# Patient Record
Sex: FEMALE | Race: WHITE | NOT HISPANIC OR LATINO
[De-identification: names, ages, dates, MRNs, and addresses within clinical notes are randomized per-mention and may not be internally consistent; named-entity substitution may affect disease eponyms.]

---

## 2022-04-30 ENCOUNTER — TRANSCRIPTION ENCOUNTER (OUTPATIENT)
Age: 32
End: 2022-04-30

## 2022-04-30 ENCOUNTER — INPATIENT (INPATIENT)
Facility: HOSPITAL | Age: 32
LOS: 3 days | Discharge: HOME | End: 2022-05-04
Attending: SPECIALIST | Admitting: SPECIALIST
Payer: COMMERCIAL

## 2022-04-30 DIAGNOSIS — O26.899 OTHER SPECIFIED PREGNANCY RELATED CONDITIONS, UNSPECIFIED TRIMESTER: ICD-10-CM

## 2022-04-30 DIAGNOSIS — Z3A.00 WEEKS OF GESTATION OF PREGNANCY NOT SPECIFIED: ICD-10-CM

## 2022-04-30 LAB
BASOPHILS # BLD AUTO: 0.02 K/UL — SIGNIFICANT CHANGE UP (ref 0–0.2)
BASOPHILS NFR BLD AUTO: 0.2 % — SIGNIFICANT CHANGE UP (ref 0–2)
EOSINOPHIL # BLD AUTO: 0.02 K/UL — SIGNIFICANT CHANGE UP (ref 0–0.5)
EOSINOPHIL NFR BLD AUTO: 0.2 % — SIGNIFICANT CHANGE UP (ref 0–6)
HCT VFR BLD CALC: 38.7 % — SIGNIFICANT CHANGE UP (ref 34.5–45)
HGB BLD-MCNC: 13.1 G/DL — SIGNIFICANT CHANGE UP (ref 11.5–15.5)
IMM GRANULOCYTES NFR BLD AUTO: 0.5 % — SIGNIFICANT CHANGE UP (ref 0–1.5)
LYMPHOCYTES # BLD AUTO: 1.3 K/UL — SIGNIFICANT CHANGE UP (ref 1–3.3)
LYMPHOCYTES # BLD AUTO: 10 % — LOW (ref 13–44)
MCHC RBC-ENTMCNC: 32.4 PG — SIGNIFICANT CHANGE UP (ref 27–34)
MCHC RBC-ENTMCNC: 33.9 GM/DL — SIGNIFICANT CHANGE UP (ref 32–36)
MCV RBC AUTO: 95.8 FL — SIGNIFICANT CHANGE UP (ref 80–100)
MONOCYTES # BLD AUTO: 0.78 K/UL — SIGNIFICANT CHANGE UP (ref 0–0.9)
MONOCYTES NFR BLD AUTO: 6 % — SIGNIFICANT CHANGE UP (ref 2–14)
NEUTROPHILS # BLD AUTO: 10.83 K/UL — HIGH (ref 1.8–7.4)
NEUTROPHILS NFR BLD AUTO: 83.1 % — HIGH (ref 43–77)
NRBC # BLD: 0 /100 WBCS — SIGNIFICANT CHANGE UP (ref 0–0)
PLATELET # BLD AUTO: 236 K/UL — SIGNIFICANT CHANGE UP (ref 150–400)
RBC # BLD: 4.04 M/UL — SIGNIFICANT CHANGE UP (ref 3.8–5.2)
RBC # FLD: 13.8 % — SIGNIFICANT CHANGE UP (ref 10.3–14.5)
WBC # BLD: 13.01 K/UL — HIGH (ref 3.8–10.5)
WBC # FLD AUTO: 13.01 K/UL — HIGH (ref 3.8–10.5)

## 2022-04-30 RX ORDER — SODIUM CHLORIDE 9 MG/ML
1000 INJECTION, SOLUTION INTRAVENOUS
Refills: 0 | Status: DISCONTINUED | OUTPATIENT
Start: 2022-04-30 | End: 2022-05-01

## 2022-04-30 RX ORDER — AMPICILLIN TRIHYDRATE 250 MG
2 CAPSULE ORAL ONCE
Refills: 0 | Status: COMPLETED | OUTPATIENT
Start: 2022-04-30 | End: 2022-04-30

## 2022-04-30 RX ORDER — OXYTOCIN 10 UNIT/ML
333.33 VIAL (ML) INJECTION
Qty: 20 | Refills: 0 | Status: DISCONTINUED | OUTPATIENT
Start: 2022-04-30 | End: 2022-05-01

## 2022-04-30 RX ORDER — FENTANYL/BUPIVACAINE/NS/PF 2MCG/ML-.1
250 PLASTIC BAG, INJECTION (ML) INJECTION
Refills: 0 | Status: DISCONTINUED | OUTPATIENT
Start: 2022-04-30 | End: 2022-05-01

## 2022-04-30 RX ORDER — AMPICILLIN TRIHYDRATE 250 MG
1 CAPSULE ORAL ONCE
Refills: 0 | Status: COMPLETED | OUTPATIENT
Start: 2022-04-30 | End: 2022-05-01

## 2022-04-30 RX ORDER — CITRIC ACID/SODIUM CITRATE 300-500 MG
15 SOLUTION, ORAL ORAL ONCE
Refills: 0 | Status: DISCONTINUED | OUTPATIENT
Start: 2022-04-30 | End: 2022-05-01

## 2022-04-30 RX ADMIN — Medication 216 GRAM(S): at 23:26

## 2022-04-30 RX ADMIN — SODIUM CHLORIDE 125 MILLILITER(S): 9 INJECTION, SOLUTION INTRAVENOUS at 23:25

## 2022-05-01 VITALS — HEIGHT: 64 IN | WEIGHT: 119.05 LBS

## 2022-05-01 LAB
BLD GP AB SCN SERPL QL: NEGATIVE — SIGNIFICANT CHANGE UP
COVID-19 SPIKE DOMAIN AB INTERP: POSITIVE
COVID-19 SPIKE DOMAIN ANTIBODY RESULT: 241 U/ML — HIGH
RH IG SCN BLD-IMP: POSITIVE — SIGNIFICANT CHANGE UP
SARS-COV-2 IGG+IGM SERPL QL IA: 241 U/ML — HIGH
SARS-COV-2 IGG+IGM SERPL QL IA: POSITIVE

## 2022-05-01 RX ORDER — SIMETHICONE 80 MG/1
80 TABLET, CHEWABLE ORAL EVERY 4 HOURS
Refills: 0 | Status: DISCONTINUED | OUTPATIENT
Start: 2022-05-01 | End: 2022-05-04

## 2022-05-01 RX ORDER — ENOXAPARIN SODIUM 100 MG/ML
40 INJECTION SUBCUTANEOUS EVERY 24 HOURS
Refills: 0 | Status: DISCONTINUED | OUTPATIENT
Start: 2022-05-01 | End: 2022-05-04

## 2022-05-01 RX ORDER — SODIUM CHLORIDE 9 MG/ML
1000 INJECTION, SOLUTION INTRAVENOUS
Refills: 0 | Status: DISCONTINUED | OUTPATIENT
Start: 2022-05-01 | End: 2022-05-04

## 2022-05-01 RX ORDER — MAGNESIUM HYDROXIDE 400 MG/1
30 TABLET, CHEWABLE ORAL
Refills: 0 | Status: DISCONTINUED | OUTPATIENT
Start: 2022-05-01 | End: 2022-05-04

## 2022-05-01 RX ORDER — DEXAMETHASONE 0.5 MG/5ML
4 ELIXIR ORAL EVERY 6 HOURS
Refills: 0 | Status: DISCONTINUED | OUTPATIENT
Start: 2022-05-01 | End: 2022-05-01

## 2022-05-01 RX ORDER — OXYTOCIN 10 UNIT/ML
2 VIAL (ML) INJECTION
Qty: 30 | Refills: 0 | Status: DISCONTINUED | OUTPATIENT
Start: 2022-05-01 | End: 2022-05-01

## 2022-05-01 RX ORDER — MORPHINE SULFATE 50 MG/1
3 CAPSULE, EXTENDED RELEASE ORAL ONCE
Refills: 0 | Status: DISCONTINUED | OUTPATIENT
Start: 2022-05-01 | End: 2022-05-01

## 2022-05-01 RX ORDER — LANOLIN
1 OINTMENT (GRAM) TOPICAL EVERY 6 HOURS
Refills: 0 | Status: DISCONTINUED | OUTPATIENT
Start: 2022-05-01 | End: 2022-05-04

## 2022-05-01 RX ORDER — CITRIC ACID/SODIUM CITRATE 300-500 MG
30 SOLUTION, ORAL ORAL ONCE
Refills: 0 | Status: DISCONTINUED | OUTPATIENT
Start: 2022-05-01 | End: 2022-05-01

## 2022-05-01 RX ORDER — ACETAMINOPHEN 500 MG
975 TABLET ORAL ONCE
Refills: 0 | Status: COMPLETED | OUTPATIENT
Start: 2022-05-01 | End: 2022-05-01

## 2022-05-01 RX ORDER — LEVOTHYROXINE SODIUM 125 MCG
75 TABLET ORAL EVERY 24 HOURS
Refills: 0 | Status: DISCONTINUED | OUTPATIENT
Start: 2022-05-01 | End: 2022-05-02

## 2022-05-01 RX ORDER — KETOROLAC TROMETHAMINE 30 MG/ML
30 SYRINGE (ML) INJECTION EVERY 6 HOURS
Refills: 0 | Status: COMPLETED | OUTPATIENT
Start: 2022-05-01 | End: 2022-05-03

## 2022-05-01 RX ORDER — CEFAZOLIN SODIUM 1 G
2000 VIAL (EA) INJECTION ONCE
Refills: 0 | Status: COMPLETED | OUTPATIENT
Start: 2022-05-01 | End: 2022-05-01

## 2022-05-01 RX ORDER — AZITHROMYCIN 500 MG/1
500 TABLET, FILM COATED ORAL ONCE
Refills: 0 | Status: DISCONTINUED | OUTPATIENT
Start: 2022-05-01 | End: 2022-05-01

## 2022-05-01 RX ORDER — TETANUS TOXOID, REDUCED DIPHTHERIA TOXOID AND ACELLULAR PERTUSSIS VACCINE, ADSORBED 5; 2.5; 8; 8; 2.5 [IU]/.5ML; [IU]/.5ML; UG/.5ML; UG/.5ML; UG/.5ML
0.5 SUSPENSION INTRAMUSCULAR ONCE
Refills: 0 | Status: DISCONTINUED | OUTPATIENT
Start: 2022-05-01 | End: 2022-05-04

## 2022-05-01 RX ORDER — OXYCODONE HYDROCHLORIDE 5 MG/1
5 TABLET ORAL ONCE
Refills: 0 | Status: DISCONTINUED | OUTPATIENT
Start: 2022-05-01 | End: 2022-05-04

## 2022-05-01 RX ORDER — DIPHENHYDRAMINE HCL 50 MG
25 CAPSULE ORAL EVERY 6 HOURS
Refills: 0 | Status: DISCONTINUED | OUTPATIENT
Start: 2022-05-01 | End: 2022-05-04

## 2022-05-01 RX ORDER — AMPICILLIN TRIHYDRATE 250 MG
1 CAPSULE ORAL EVERY 4 HOURS
Refills: 0 | Status: DISCONTINUED | OUTPATIENT
Start: 2022-05-01 | End: 2022-05-01

## 2022-05-01 RX ORDER — CHLORHEXIDINE GLUCONATE 213 G/1000ML
1 SOLUTION TOPICAL DAILY
Refills: 0 | Status: DISCONTINUED | OUTPATIENT
Start: 2022-05-01 | End: 2022-05-01

## 2022-05-01 RX ORDER — ONDANSETRON 8 MG/1
4 TABLET, FILM COATED ORAL ONCE
Refills: 0 | Status: COMPLETED | OUTPATIENT
Start: 2022-05-01 | End: 2022-05-01

## 2022-05-01 RX ORDER — OXYTOCIN 10 UNIT/ML
333.33 VIAL (ML) INJECTION
Qty: 20 | Refills: 0 | Status: DISCONTINUED | OUTPATIENT
Start: 2022-05-01 | End: 2022-05-04

## 2022-05-01 RX ORDER — ACETAMINOPHEN 500 MG
975 TABLET ORAL
Refills: 0 | Status: DISCONTINUED | OUTPATIENT
Start: 2022-05-01 | End: 2022-05-04

## 2022-05-01 RX ORDER — METOCLOPRAMIDE HCL 10 MG
10 TABLET ORAL ONCE
Refills: 0 | Status: COMPLETED | OUTPATIENT
Start: 2022-05-01 | End: 2022-05-01

## 2022-05-01 RX ORDER — OXYCODONE HYDROCHLORIDE 5 MG/1
5 TABLET ORAL
Refills: 0 | Status: COMPLETED | OUTPATIENT
Start: 2022-05-01 | End: 2022-05-08

## 2022-05-01 RX ORDER — ONDANSETRON 8 MG/1
4 TABLET, FILM COATED ORAL EVERY 6 HOURS
Refills: 0 | Status: DISCONTINUED | OUTPATIENT
Start: 2022-05-01 | End: 2022-05-01

## 2022-05-01 RX ORDER — IBUPROFEN 200 MG
600 TABLET ORAL EVERY 6 HOURS
Refills: 0 | Status: COMPLETED | OUTPATIENT
Start: 2022-05-01 | End: 2023-03-30

## 2022-05-01 RX ORDER — NALOXONE HYDROCHLORIDE 4 MG/.1ML
0.1 SPRAY NASAL
Refills: 0 | Status: DISCONTINUED | OUTPATIENT
Start: 2022-05-01 | End: 2022-05-01

## 2022-05-01 RX ADMIN — Medication 10 MILLIGRAM(S): at 15:59

## 2022-05-01 RX ADMIN — Medication 100 MILLIGRAM(S): at 07:38

## 2022-05-01 RX ADMIN — ONDANSETRON 4 MILLIGRAM(S): 8 TABLET, FILM COATED ORAL at 13:57

## 2022-05-01 RX ADMIN — Medication 2 MILLIUNIT(S)/MIN: at 01:11

## 2022-05-01 RX ADMIN — Medication 975 MILLIGRAM(S): at 22:00

## 2022-05-01 RX ADMIN — Medication 108 GRAM(S): at 03:32

## 2022-05-01 RX ADMIN — ENOXAPARIN SODIUM 40 MILLIGRAM(S): 100 INJECTION SUBCUTANEOUS at 21:32

## 2022-05-01 RX ADMIN — Medication 30 MILLIGRAM(S): at 18:37

## 2022-05-01 RX ADMIN — Medication 975 MILLIGRAM(S): at 00:49

## 2022-05-01 RX ADMIN — Medication 30 MILLIGRAM(S): at 10:34

## 2022-05-01 RX ADMIN — Medication 30 MILLIGRAM(S): at 19:00

## 2022-05-01 RX ADMIN — Medication 975 MILLIGRAM(S): at 01:15

## 2022-05-01 RX ADMIN — SIMETHICONE 80 MILLIGRAM(S): 80 TABLET, CHEWABLE ORAL at 23:11

## 2022-05-01 RX ADMIN — Medication 975 MILLIGRAM(S): at 21:24

## 2022-05-01 NOTE — PATIENT PROFILE OB - FALL HARM RISK - UNIVERSAL INTERVENTIONS
Bed in lowest position, wheels locked, appropriate side rails in place/Call bell, personal items and telephone in reach/Instruct patient to call for assistance before getting out of bed or chair/Non-slip footwear when patient is out of bed/[place] to call system/Physically safe environment - no spills, clutter or unnecessary equipment/Purposeful Proactive Rounding/Room/bathroom lighting operational, light cord in reach

## 2022-05-02 LAB
BASOPHILS # BLD AUTO: 0.02 K/UL — SIGNIFICANT CHANGE UP (ref 0–0.2)
BASOPHILS NFR BLD AUTO: 0.1 % — SIGNIFICANT CHANGE UP (ref 0–2)
EOSINOPHIL # BLD AUTO: 0.03 K/UL — SIGNIFICANT CHANGE UP (ref 0–0.5)
EOSINOPHIL NFR BLD AUTO: 0.2 % — SIGNIFICANT CHANGE UP (ref 0–6)
HCT VFR BLD CALC: 31.8 % — LOW (ref 34.5–45)
HGB BLD-MCNC: 10.6 G/DL — LOW (ref 11.5–15.5)
IMM GRANULOCYTES NFR BLD AUTO: 0.5 % — SIGNIFICANT CHANGE UP (ref 0–1.5)
LYMPHOCYTES # BLD AUTO: 1.05 K/UL — SIGNIFICANT CHANGE UP (ref 1–3.3)
LYMPHOCYTES # BLD AUTO: 7 % — LOW (ref 13–44)
MCHC RBC-ENTMCNC: 31.7 PG — SIGNIFICANT CHANGE UP (ref 27–34)
MCHC RBC-ENTMCNC: 33.3 GM/DL — SIGNIFICANT CHANGE UP (ref 32–36)
MCV RBC AUTO: 95.2 FL — SIGNIFICANT CHANGE UP (ref 80–100)
MONOCYTES # BLD AUTO: 0.7 K/UL — SIGNIFICANT CHANGE UP (ref 0–0.9)
MONOCYTES NFR BLD AUTO: 4.7 % — SIGNIFICANT CHANGE UP (ref 2–14)
NEUTROPHILS # BLD AUTO: 13.12 K/UL — HIGH (ref 1.8–7.4)
NEUTROPHILS NFR BLD AUTO: 87.5 % — HIGH (ref 43–77)
NRBC # BLD: 0 /100 WBCS — SIGNIFICANT CHANGE UP (ref 0–0)
PLATELET # BLD AUTO: 159 K/UL — SIGNIFICANT CHANGE UP (ref 150–400)
RBC # BLD: 3.34 M/UL — LOW (ref 3.8–5.2)
RBC # FLD: 14 % — SIGNIFICANT CHANGE UP (ref 10.3–14.5)
T PALLIDUM AB TITR SER: NEGATIVE — SIGNIFICANT CHANGE UP
WBC # BLD: 14.99 K/UL — HIGH (ref 3.8–10.5)
WBC # FLD AUTO: 14.99 K/UL — HIGH (ref 3.8–10.5)

## 2022-05-02 RX ORDER — LEVOTHYROXINE SODIUM 125 MCG
50 TABLET ORAL DAILY
Refills: 0 | Status: DISCONTINUED | OUTPATIENT
Start: 2022-05-02 | End: 2022-05-04

## 2022-05-02 RX ORDER — IBUPROFEN 200 MG
600 TABLET ORAL EVERY 6 HOURS
Refills: 0 | Status: DISCONTINUED | OUTPATIENT
Start: 2022-05-02 | End: 2022-05-04

## 2022-05-02 RX ADMIN — Medication 975 MILLIGRAM(S): at 16:16

## 2022-05-02 RX ADMIN — SIMETHICONE 80 MILLIGRAM(S): 80 TABLET, CHEWABLE ORAL at 12:24

## 2022-05-02 RX ADMIN — Medication 975 MILLIGRAM(S): at 20:32

## 2022-05-02 RX ADMIN — Medication 975 MILLIGRAM(S): at 15:13

## 2022-05-02 RX ADMIN — Medication 975 MILLIGRAM(S): at 10:11

## 2022-05-02 RX ADMIN — SIMETHICONE 80 MILLIGRAM(S): 80 TABLET, CHEWABLE ORAL at 19:07

## 2022-05-02 RX ADMIN — Medication 30 MILLIGRAM(S): at 06:39

## 2022-05-02 RX ADMIN — Medication 975 MILLIGRAM(S): at 21:02

## 2022-05-02 RX ADMIN — Medication 975 MILLIGRAM(S): at 09:12

## 2022-05-02 RX ADMIN — Medication 30 MILLIGRAM(S): at 01:00

## 2022-05-02 RX ADMIN — Medication 30 MILLIGRAM(S): at 20:01

## 2022-05-02 RX ADMIN — Medication 30 MILLIGRAM(S): at 17:55

## 2022-05-02 RX ADMIN — Medication 30 MILLIGRAM(S): at 12:17

## 2022-05-02 RX ADMIN — Medication 30 MILLIGRAM(S): at 00:38

## 2022-05-02 RX ADMIN — Medication 30 MILLIGRAM(S): at 13:00

## 2022-05-02 RX ADMIN — Medication 975 MILLIGRAM(S): at 03:10

## 2022-05-02 RX ADMIN — Medication 975 MILLIGRAM(S): at 03:50

## 2022-05-02 RX ADMIN — Medication 30 MILLIGRAM(S): at 05:59

## 2022-05-02 NOTE — LACTATION INITIAL EVALUATION - LACTATION INTERVENTIONS
initiate/review safe skin-to-skin/initiate/review hand expression/initiate/review techniques for position and latch/post discharge community resources provided/review techniques to increase milk supply/reviewed components of an effective feeding and at least 8 effective feedings per day required/reviewed importance of monitoring infant diapers, the breastfeeding log, and minimum output each day/reviewed risks of unnecessary formula supplementation/reviewed risks of artificial nipples/reviewed strategies to transition to breastfeeding only/reviewed benefits and recommendations for rooming in/reviewed feeding on demand/by cue at least 8 times a day/reviewed indications of inadequate milk transfer that would require supplementation

## 2022-05-02 NOTE — LACTATION INITIAL EVALUATION - NS LACT CON REASON FOR REQ
40.6 wk gestation baby, about 1 day old at this time. I observed the baby STS with the mother breastfeeding with a painful latch. I assisted mom with repositioning infant and infant became irritable and refused to suckle at the breast. After several tries infant became inconsolable and mother requested to give formula feeding. Infant has previously been formula feeding overnight and mom has very small drops of colostrum. I provided breastfeeding education and explained normal  behaviour and the milk production feedback system. Infant was spoon fed about 5ml of Similac and then assisted with positioning in a  football hold and taught latch strategies. Baby was able to latch deeply and is feeding well, rhythmically sucking between short pauses of rest. Mother to continue with STS when possible, stop formula supplements and use of artificial nipples,  room-in, and feed as per cues at least 8-12x/ day.  All questions answered and parents verbalized understanding instructions and are agreeable with recommendations. Primary RN updated on pt status and plan. To f/u as needed./primaparous mom/staff request

## 2022-05-03 ENCOUNTER — TRANSCRIPTION ENCOUNTER (OUTPATIENT)
Age: 32
End: 2022-05-03

## 2022-05-03 RX ORDER — OXYCODONE HYDROCHLORIDE 5 MG/1
5 TABLET ORAL
Refills: 0 | Status: DISCONTINUED | OUTPATIENT
Start: 2022-05-03 | End: 2022-05-04

## 2022-05-03 RX ORDER — ACETAMINOPHEN 500 MG
3 TABLET ORAL
Qty: 0 | Refills: 0 | DISCHARGE
Start: 2022-05-03

## 2022-05-03 RX ORDER — IBUPROFEN 200 MG
1 TABLET ORAL
Qty: 0 | Refills: 0 | DISCHARGE
Start: 2022-05-03

## 2022-05-03 RX ADMIN — Medication 975 MILLIGRAM(S): at 03:41

## 2022-05-03 RX ADMIN — SIMETHICONE 80 MILLIGRAM(S): 80 TABLET, CHEWABLE ORAL at 12:47

## 2022-05-03 RX ADMIN — OXYCODONE HYDROCHLORIDE 5 MILLIGRAM(S): 5 TABLET ORAL at 22:09

## 2022-05-03 RX ADMIN — Medication 600 MILLIGRAM(S): at 00:13

## 2022-05-03 RX ADMIN — Medication 975 MILLIGRAM(S): at 03:11

## 2022-05-03 RX ADMIN — Medication 50 MICROGRAM(S): at 07:34

## 2022-05-03 RX ADMIN — Medication 600 MILLIGRAM(S): at 17:52

## 2022-05-03 RX ADMIN — Medication 600 MILLIGRAM(S): at 00:45

## 2022-05-03 RX ADMIN — ENOXAPARIN SODIUM 40 MILLIGRAM(S): 100 INJECTION SUBCUTANEOUS at 21:04

## 2022-05-03 RX ADMIN — Medication 975 MILLIGRAM(S): at 21:04

## 2022-05-03 RX ADMIN — Medication 975 MILLIGRAM(S): at 14:54

## 2022-05-03 RX ADMIN — Medication 600 MILLIGRAM(S): at 17:58

## 2022-05-03 RX ADMIN — Medication 975 MILLIGRAM(S): at 22:00

## 2022-05-03 RX ADMIN — OXYCODONE HYDROCHLORIDE 5 MILLIGRAM(S): 5 TABLET ORAL at 22:45

## 2022-05-03 RX ADMIN — Medication 600 MILLIGRAM(S): at 07:34

## 2022-05-03 RX ADMIN — Medication 975 MILLIGRAM(S): at 15:30

## 2022-05-03 RX ADMIN — Medication 600 MILLIGRAM(S): at 13:07

## 2022-05-03 RX ADMIN — SIMETHICONE 80 MILLIGRAM(S): 80 TABLET, CHEWABLE ORAL at 22:09

## 2022-05-03 RX ADMIN — Medication 975 MILLIGRAM(S): at 09:33

## 2022-05-03 RX ADMIN — Medication 600 MILLIGRAM(S): at 12:45

## 2022-05-03 NOTE — DISCHARGE NOTE OB - NS MD DC FALL RISK RISK
For information on Fall & Injury Prevention, visit: [email or web address] OR  [email or web address] OR  https://www.cdc.gov/steadi/patient.html

## 2022-05-03 NOTE — DISCHARGE NOTE OB - PATIENT PORTAL LINK FT
You can access the FollowMyHealth Patient Portal offered by [facility] by registering at the following website: [email or web address]. By joining [facility]’s FollowMyHealth portal, you will also be able to view your health information using other applications (apps) compatible with our system.

## 2022-05-03 NOTE — DISCHARGE NOTE OB - HOSPITAL COURSE
Admitted in early labor.  Delivered via  section due to NRFHT.  Uncomplicated surgery and postpartum course.  Acute blood loss anemia noted on post-operative CBC.  Patient stable with normal vital signs.  No intervention necessary.

## 2022-05-04 VITALS
HEART RATE: 67 BPM | OXYGEN SATURATION: 99 % | RESPIRATION RATE: 18 BRPM | SYSTOLIC BLOOD PRESSURE: 108 MMHG | DIASTOLIC BLOOD PRESSURE: 77 MMHG | TEMPERATURE: 98 F

## 2022-05-04 PROCEDURE — 99214 OFFICE O/P EST MOD 30 MIN: CPT

## 2022-05-04 PROCEDURE — 86900 BLOOD TYPING SEROLOGIC ABO: CPT

## 2022-05-04 PROCEDURE — 86780 TREPONEMA PALLIDUM: CPT

## 2022-05-04 PROCEDURE — 59050 FETAL MONITOR W/REPORT: CPT

## 2022-05-04 PROCEDURE — 85025 COMPLETE CBC W/AUTO DIFF WBC: CPT

## 2022-05-04 PROCEDURE — 86901 BLOOD TYPING SEROLOGIC RH(D): CPT

## 2022-05-04 PROCEDURE — 86850 RBC ANTIBODY SCREEN: CPT

## 2022-05-04 PROCEDURE — 88307 TISSUE EXAM BY PATHOLOGIST: CPT

## 2022-05-04 PROCEDURE — 36415 COLL VENOUS BLD VENIPUNCTURE: CPT

## 2022-05-04 PROCEDURE — 86769 SARS-COV-2 COVID-19 ANTIBODY: CPT

## 2022-05-04 RX ADMIN — Medication 600 MILLIGRAM(S): at 00:49

## 2022-05-04 RX ADMIN — Medication 600 MILLIGRAM(S): at 06:34

## 2022-05-04 RX ADMIN — Medication 600 MILLIGRAM(S): at 12:47

## 2022-05-04 RX ADMIN — Medication 600 MILLIGRAM(S): at 12:53

## 2022-05-04 RX ADMIN — Medication 975 MILLIGRAM(S): at 09:24

## 2022-05-04 RX ADMIN — Medication 50 MICROGRAM(S): at 06:33

## 2022-05-04 RX ADMIN — Medication 600 MILLIGRAM(S): at 01:15

## 2022-05-04 RX ADMIN — Medication 975 MILLIGRAM(S): at 03:23

## 2022-05-04 RX ADMIN — Medication 975 MILLIGRAM(S): at 04:15

## 2022-05-04 RX ADMIN — Medication 975 MILLIGRAM(S): at 09:47

## 2022-05-04 NOTE — PROGRESS NOTE ADULT - ASSESSMENT
31y Female POD#2    s/p C/S, Uncomplicated                                       - Neuro/Pain:  toradol atc, tylenol atc, oxy prn  - CV:  VS per routine  - Pulm: Encourage ISS & Ambulation  - GI: Advance as tolerated  - : Voiding spontaneously  - DVT ppx: SCDs, Lovenox 40mg QD  - Dispo: POD #2/3
31y Female POD#3    s/p C/S, Uncomplicated                                       - Neuro/Pain:  toradol atc, tylenol atc, oxy prn  - CV:  VS per routine  - Pulm: Encourage ISS & Ambulation  - GI: Advance as tolerated  - : Voiding spontaneously  - DVT ppx: SCDs, Lovenox 40mg QD  - Dispo: POD #3
31y Female POD#1    s/p C/S, Uncomplicated                                       - Neuro/Pain:  toradol atc, tylenol atc, oxy prn  - CV:  VS per routine, AM CBC pending  - Pulm: Encourage ISS & Ambulation  - GI: Advance as tolerated  - : Zaldivar in place, to be removed this morning, TOV this afternoon  - DVT ppx: SCDs, Lovenox 40mg QD  - Dispo: POD #2/3

## 2022-05-04 NOTE — PROGRESS NOTE ADULT - SUBJECTIVE AND OBJECTIVE BOX
Patient evaluated at bedside.   She reports pain is well controlled.  Zaldivar in place  No Flatus  Not OOB yet.    She denies HA, dizziness, CP, palpitations, SOB, n/v, or heavy vaginal bleeding.    Physical Exam:  Vital Signs Last 24 Hrs  T(C): 36.3 (02 May 2022 06:05), Max: 37.2 (01 May 2022 11:10)  T(F): 97.4 (02 May 2022 06:05), Max: 99 (01 May 2022 11:10)  HR: 65 (02 May 2022 06:05) (65 - 98)  BP: 104/63 (02 May 2022 06:05) (91/50 - 114/74)  BP(mean): 77 (01 May 2022 11:10) (64 - 77)  RR: 17 (02 May 2022 06:05) (16 - 18)  SpO2: 98% (02 May 2022 06:05) (93% - 98%)    Gen: NAD  Abd: + BS, soft, nontender, nondistended, no rebound or guarding  Incision clean, dry and intact  uterus firm at midline  : lochia WNL  Extremities: no swelling or calf tenderness                          13.1   13.01 )-----------( 236      ( 30 Apr 2022 22:54 )             38.7               
Patient evaluated at bedside.   She reports pain is well controlled with OPM.  She has been ambulating without assistance, voiding spontaneously, passing gas, tolerating regular diet and is breastfeeding.    She denies HA, dizziness, CP, palpitations, SOB, n/v, or heavy vaginal bleeding.    Physical Exam:  Vital Signs Last 24 Hrs  T(C): 36.4 (03 May 2022 06:08), Max: 36.5 (02 May 2022 19:03)  T(F): 97.6 (03 May 2022 06:08), Max: 97.7 (02 May 2022 19:03)  HR: 66 (03 May 2022 06:08) (66 - 76)  BP: 99/65 (03 May 2022 06:08) (99/65 - 119/76)  BP(mean): --  RR: 18 (03 May 2022 06:08) (17 - 18)  SpO2: 98% (03 May 2022 06:08) (98% - 100%)    Gen: NAD  Abd: + BS, soft, nontender, nondistended, no rebound or guarding  Incision clean, dry and intact  uterus firm at midline  : lochia WNL  Extremities: no swelling or calf tenderness                          10.6   14.99 )-----------( 159      ( 02 May 2022 08:18 )             31.8               
Patient evaluated at bedside.   She reports pain is well controlled with OPM.  She has been ambulating without assistance, voiding spontaneously, passing gas, tolerating regular diet and is breastfeeding.    She denies HA, dizziness, CP, palpitations, SOB, n/v, or heavy vaginal bleeding.    Physical Exam:  Vital Signs Last 24 Hrs  T(C): 36.6 (04 May 2022 06:04), Max: 36.9 (03 May 2022 22:03)  T(F): 97.8 (04 May 2022 06:04), Max: 98.4 (03 May 2022 22:03)  HR: 72 (04 May 2022 06:04) (69 - 79)  BP: 117/76 (04 May 2022 06:04) (111/71 - 117/76)  BP(mean): --  RR: 17 (04 May 2022 06:04) (17 - 18)  SpO2: 98% (04 May 2022 06:04) (96% - 98%)    Gen: NAD  Abd: + BS, soft, nontender, nondistended, no rebound or guarding  Incision clean, dry and intact  uterus firm at midline  : lochia WNL  Extremities: no swelling or calf tenderness

## 2022-05-10 DIAGNOSIS — Z79.890 HORMONE REPLACEMENT THERAPY: ICD-10-CM

## 2022-05-10 DIAGNOSIS — Z28.09 IMMUNIZATION NOT CARRIED OUT BECAUSE OF OTHER CONTRAINDICATION: ICD-10-CM

## 2022-05-10 DIAGNOSIS — E03.9 HYPOTHYROIDISM, UNSPECIFIED: ICD-10-CM

## 2022-05-10 DIAGNOSIS — D62 ACUTE POSTHEMORRHAGIC ANEMIA: ICD-10-CM

## 2022-05-10 DIAGNOSIS — Z3A.40 40 WEEKS GESTATION OF PREGNANCY: ICD-10-CM

## 2022-05-16 LAB — SURGICAL PATHOLOGY STUDY: SIGNIFICANT CHANGE UP

## 2022-08-23 NOTE — DISCHARGE NOTE OB - CHANGE SANITARY PADS FREQUENTLY.  WASHING AND WIPING SHOULD OCCUR FROM FRONT TO BACK
Incoming Refill Request      Medication requested (name and dose): FACE WASH, BUT HE DOES NOT KNOW THE NAME OF IT.    [facility] where [facility] sent[facility]    Additional details provided by patient: PATIENT IS OUT OF THE MEDICATION    Best call back number: [phone]     Does the patient have less than a 3 day supply:  [x] Yes  [] No    Papa Wu Rep  08/23/22, 09:21 EDT            
LOV for chronic condition 4/20/2022  NOV 9/22/2022    
Statement Selected

## 2024-05-31 ENCOUNTER — APPOINTMENT (OUTPATIENT)
Dept: ANTEPARTUM | Facility: CLINIC | Age: 34
End: 2024-05-31
Payer: COMMERCIAL

## 2024-05-31 ENCOUNTER — TRANSCRIPTION ENCOUNTER (OUTPATIENT)
Age: 34
End: 2024-05-31

## 2024-05-31 ENCOUNTER — ASOB RESULT (OUTPATIENT)
Age: 34
End: 2024-05-31

## 2024-05-31 PROCEDURE — 36415 COLL VENOUS BLD VENIPUNCTURE: CPT

## 2024-05-31 PROCEDURE — 93976 VASCULAR STUDY: CPT

## 2024-05-31 PROCEDURE — 76813 OB US NUCHAL MEAS 1 GEST: CPT

## 2024-07-26 ENCOUNTER — APPOINTMENT (OUTPATIENT)
Dept: ANTEPARTUM | Facility: CLINIC | Age: 34
End: 2024-07-26
Payer: COMMERCIAL

## 2024-07-26 ENCOUNTER — ASOB RESULT (OUTPATIENT)
Age: 34
End: 2024-07-26

## 2024-07-26 PROCEDURE — 76811 OB US DETAILED SNGL FETUS: CPT

## 2024-07-26 PROCEDURE — 76817 TRANSVAGINAL US OBSTETRIC: CPT

## 2024-07-29 ENCOUNTER — APPOINTMENT (OUTPATIENT)
Dept: ANTEPARTUM | Facility: CLINIC | Age: 34
End: 2024-07-29
Payer: COMMERCIAL

## 2024-07-29 ENCOUNTER — ASOB RESULT (OUTPATIENT)
Age: 34
End: 2024-07-29

## 2024-07-29 ENCOUNTER — APPOINTMENT (OUTPATIENT)
Dept: MATERNAL FETAL MEDICINE | Facility: CLINIC | Age: 34
End: 2024-07-29
Payer: COMMERCIAL

## 2024-07-29 PROBLEM — Z00.00 ENCOUNTER FOR PREVENTIVE HEALTH EXAMINATION: Status: ACTIVE | Noted: 2024-07-29

## 2024-07-29 PROCEDURE — 76815 OB US LIMITED FETUS(S): CPT

## 2024-07-29 PROCEDURE — 99215 OFFICE O/P EST HI 40 MIN: CPT | Mod: 25

## 2024-07-29 PROCEDURE — 99215 OFFICE O/P EST HI 40 MIN: CPT

## 2024-07-29 NOTE — HISTORY OF PRESENT ILLNESS
[FreeTextEntry1] :  Ms. Foster is a 34 yo  at 22 wga presenting for MFM consultation.   On anatomy scan the fetus was noted to have a CPAM - please see US reports for full details. The patient and her partner have elected to proceed with pregnancy termination due to this fetal anomaly.   She has a history of CS x1.

## 2024-07-29 NOTE — DISCUSSION/SUMMARY
[FreeTextEntry1] :  Recommendations: The patient will be offered consultation with Dr. Maggi Neri (reproductive psychologist). After considering the risks, benefits, and alternatives of pregnancy continuation versus termination the patient and her partner have elected to proceed with pregnancy termination. They were counseled on the options for termination with IOL versus D&E. The patient was provided my contact information and after speaking with her primary OB will inform us of her decisions.

## 2024-07-30 ENCOUNTER — TRANSCRIPTION ENCOUNTER (OUTPATIENT)
Age: 34
End: 2024-07-30

## 2024-07-31 ENCOUNTER — OUTPATIENT (OUTPATIENT)
Dept: INPATIENT UNIT | Facility: HOSPITAL | Age: 34
LOS: 1 days | Discharge: HOME | End: 2024-07-31
Payer: COMMERCIAL

## 2024-07-31 ENCOUNTER — TRANSCRIPTION ENCOUNTER (OUTPATIENT)
Age: 34
End: 2024-07-31

## 2024-07-31 VITALS
SYSTOLIC BLOOD PRESSURE: 113 MMHG | DIASTOLIC BLOOD PRESSURE: 63 MMHG | HEART RATE: 82 BPM | RESPIRATION RATE: 18 BRPM | OXYGEN SATURATION: 100 % | TEMPERATURE: 98 F

## 2024-07-31 DIAGNOSIS — Z98.891 HISTORY OF UTERINE SCAR FROM PREVIOUS SURGERY: Chronic | ICD-10-CM

## 2024-07-31 LAB
BASOPHILS # BLD AUTO: 0.02 K/UL — SIGNIFICANT CHANGE UP (ref 0–0.2)
BASOPHILS NFR BLD AUTO: 0.2 % — SIGNIFICANT CHANGE UP (ref 0–2)
BLD GP AB SCN SERPL QL: NEGATIVE — SIGNIFICANT CHANGE UP
EOSINOPHIL # BLD AUTO: 0.03 K/UL — SIGNIFICANT CHANGE UP (ref 0–0.5)
EOSINOPHIL NFR BLD AUTO: 0.3 % — SIGNIFICANT CHANGE UP (ref 0–6)
HCT VFR BLD CALC: 34.5 % — SIGNIFICANT CHANGE UP (ref 34.5–45)
HGB BLD-MCNC: 11.6 G/DL — SIGNIFICANT CHANGE UP (ref 11.5–15.5)
IMM GRANULOCYTES NFR BLD AUTO: 0.3 % — SIGNIFICANT CHANGE UP (ref 0–0.9)
LYMPHOCYTES # BLD AUTO: 1.25 K/UL — SIGNIFICANT CHANGE UP (ref 1–3.3)
LYMPHOCYTES # BLD AUTO: 13.9 % — SIGNIFICANT CHANGE UP (ref 13–44)
MCHC RBC-ENTMCNC: 30.4 PG — SIGNIFICANT CHANGE UP (ref 27–34)
MCHC RBC-ENTMCNC: 33.6 GM/DL — SIGNIFICANT CHANGE UP (ref 32–36)
MCV RBC AUTO: 90.6 FL — SIGNIFICANT CHANGE UP (ref 80–100)
MONOCYTES # BLD AUTO: 0.44 K/UL — SIGNIFICANT CHANGE UP (ref 0–0.9)
MONOCYTES NFR BLD AUTO: 4.9 % — SIGNIFICANT CHANGE UP (ref 2–14)
NEUTROPHILS # BLD AUTO: 7.25 K/UL — SIGNIFICANT CHANGE UP (ref 1.8–7.4)
NEUTROPHILS NFR BLD AUTO: 80.4 % — HIGH (ref 43–77)
NRBC # BLD: 0 /100 WBCS — SIGNIFICANT CHANGE UP (ref 0–0)
PLATELET # BLD AUTO: 237 K/UL — SIGNIFICANT CHANGE UP (ref 150–400)
RBC # BLD: 3.81 M/UL — SIGNIFICANT CHANGE UP (ref 3.8–5.2)
RBC # FLD: 13.9 % — SIGNIFICANT CHANGE UP (ref 10.3–14.5)
RH IG SCN BLD-IMP: POSITIVE — SIGNIFICANT CHANGE UP
WBC # BLD: 9.02 K/UL — SIGNIFICANT CHANGE UP (ref 3.8–10.5)
WBC # FLD AUTO: 9.02 K/UL — SIGNIFICANT CHANGE UP (ref 3.8–10.5)

## 2024-07-31 PROCEDURE — 85025 COMPLETE CBC W/AUTO DIFF WBC: CPT

## 2024-07-31 PROCEDURE — 36415 COLL VENOUS BLD VENIPUNCTURE: CPT

## 2024-07-31 PROCEDURE — 86850 RBC ANTIBODY SCREEN: CPT

## 2024-07-31 PROCEDURE — 59200 INSERT CERVICAL DILATOR: CPT

## 2024-07-31 PROCEDURE — 86901 BLOOD TYPING SEROLOGIC RH(D): CPT

## 2024-07-31 PROCEDURE — 86900 BLOOD TYPING SEROLOGIC ABO: CPT

## 2024-07-31 RX ORDER — LEVOTHYROXINE SODIUM 175 MCG
75 TABLET ORAL DAILY
Refills: 0 | Status: DISCONTINUED | OUTPATIENT
Start: 2024-07-31 | End: 2024-07-31

## 2024-07-31 RX ORDER — MIFEPRISTONE 200 MG/1
200 TABLET ORAL ONCE
Refills: 0 | Status: COMPLETED | OUTPATIENT
Start: 2024-07-31 | End: 2024-07-31

## 2024-07-31 RX ORDER — IBUPROFEN 200 MG
600 TABLET ORAL ONCE
Refills: 0 | Status: COMPLETED | OUTPATIENT
Start: 2024-07-31 | End: 2024-07-31

## 2024-07-31 RX ADMIN — MIFEPRISTONE 200 MILLIGRAM(S): 200 TABLET ORAL at 13:15

## 2024-07-31 RX ADMIN — Medication 600 MILLIGRAM(S): at 13:15

## 2024-07-31 NOTE — DISCHARGE NOTE OB - MEDICATION SUMMARY - MEDICATIONS TO TAKE
I will START or STAY ON the medications listed below when I get home from the hospital:    Synthroid 75 mcg (0.075 mg) oral tablet  -- 1 tab(s) by mouth once a day  -- Indication: For hypothyroid

## 2024-07-31 NOTE — DISCHARGE NOTE OB - PATIENT PORTAL LINK FT
You can access the FollowMyHealth Patient Portal offered by [facility] by registering at the following website: [email or web address]. By joining [employer]’s FollowMyHealth portal, you will also be able to view your health information using other applications (apps) compatible with our system.

## 2024-07-31 NOTE — OB PROVIDER H&P - HISTORY OF PRESENT ILLNESS
HPI: 34 yo  at 22w2d presenting for a laminaria placement. Patient underwent MFM scans and was found to have CPAM (type 1). After multiple discussions about cystic drainage vs termination with Dr. Ball and Dr. Prescott, patient decided to terminate due to the structural abnormality. Patient understands risks and benefits of a D&E at this gestational age and still desires to proceed. D&E scheduled for 2024.     ANTE: Spontaneous pregnancy. NIPT WNL. CPAM (type 1), compresisng th eL lung with a mediastinal L shift. GBSuria +. Denies HTN or thyroid disorders. EFW 442g on .    OB: G1 -  emergency pC/S for NRFHT (3345g). G2 - current.  GynHx: Abn PAP. colposcopy WNL, f/u WNL . Denies fibroids, ovarian cysts, or STI's.  PMHx: hypothyroidism, on synthroid  PSurgHx: pC/S . wisdom Teeth removal   Medications: PNV, synthroid 75  ALL: NKDA    BP: 113/68 HR: 82  Gen: NAD, A&Ox3  Abd: non-tender, gravid  LE: no swelling or pitting edema  Skin: no excoriations or rashes  Pulm: no increased WOB  SVE: deferred      A/P: 34 yo  at 22w2w presenting for a laminaria placement, D&E scheduled for tomorrow.  - Discuss procedure, risks, and benefits with patient and Dr. Prescott  - Place laminaria with speculum   - Patient to follow up tomorrow for scheduled D&E HPI: 32 yo  at 22w2d presenting for a laminaria placement. Patient underwent MFM scans and was found to have CPAM (type 1). After multiple discussions about cystic drainage vs termination with Dr. Ball and Dr. Prescott, patient decided to terminate due to the structural abnormality. Patient understands risks and benefits of a D&E at this gestational age and still desires to proceed. D&E scheduled for 2024.     ANTE: Spontaneous pregnancy. NIPT WNL. CPAM (type 1), compresisng th eL lung with a mediastinal L shift. GBSuria +. Denies HTN or thyroid disorders. EFW 442g on .    OB: G1 -  emergency pC/S for NRFHT (3345g). G2 - current.  GynHx: Abn PAP. colposcopy WNL, f/u WNL . Denies fibroids, ovarian cysts, or STI's.  PMHx: hypothyroidism, on synthroid  PSurgHx: pC/S . wisdom Teeth removal   Medications: PNV, synthroid 75  ALL: NKDA    BP: 113/68 HR: 82  Gen: NAD, A&Ox3  Abd: non-tender, gravid  LE: no swelling or pitting edema  Skin: no excoriations or rashes  Pulm: no increased WOB  SVE: deferred    A/P: 32 yo  at 22w2w presenting for a laminaria placement, D&E scheduled for tomorrow.  - Place laminaria with speculum   - Patient to follow up tomorrow for scheduled D&E  - ibuprofen ordered for pain control  - Discussed risks, benefits, and alternatives with patient and Dr. Prescott    D/W Dr. Kenyon Carpenter PA-C HPI: 34 yo  at 22w2d presenting for a laminaria placement. Patient underwent MFM scans and was found to have CPAM (type 1). After multiple discussions about cystic drainage vs termination with Dr. Ball and Dr. Prescott, patient decided to terminate due to the structural abnormality. Patient understands risks and benefits of a D&E at this gestational age and still desires to proceed. D&E scheduled for 2024.     ANTE: Spontaneous pregnancy. NIPT WNL. CPAM (type 1), compresisng the lung with a mediastinal L shift. GBSuria +. Denies HTN or thyroid disorders. EFW 442g on .    OB: G1 -  emergency pC/S for NRFHT (3345g). G2 - current.  GynHx: Abn PAP. colposcopy WNL, f/u WNL . Denies fibroids, ovarian cysts, or STI's.  PMHx: hypothyroidism, on synthroid  PSurgHx: pC/S . wisdom Teeth removal   Medications: PNV, synthroid 75  ALL: NKDA    BP: 113/68 HR: 82  Gen: NAD, A&Ox3  Abd: non-tender, gravid  LE: no swelling or pitting edema  Skin: no excoriations or rashes  Pulm: no increased WOB  SVE: deferred    A/P: 34 yo  at 22w2w presenting for a laminaria placement, D&E scheduled for tomorrow.  - Place laminaria with speculum   - Patient to follow up tomorrow for scheduled D&E  - ibuprofen ordered for pain control  - Discussed risks, benefits, and alternatives with patient and Dr. Prescott    D/W JAMEEL Cho-C

## 2024-07-31 NOTE — OB PROVIDER H&P - NSSCHADMISSION_OBGYN_A_OB
Date of visit: 5/20/2024    10/24/23  05/20/24      DIAGNOSIS:  1. (10/24/23) Abnormal findings on MRI  * LEFT  * Film consult - LEFT MRI biopsy ordered  * Biopsy not performed.  Lesion appeared bengin  2. (10/24/23) Higher risk for breast cancer  * WILIAN 27%  3. Family history of breast cancer  * Maternal aunt  * Maternal grandmother    IMAGING/PROCEDURES:  1. (05/08/23) BILATERAL st-mammogram: BIRADS-0  * RIGHT asymmetry  2. (05/12/23) RIGHT dt-mammogram: BIRADS-1  * US not done  3. (09/19/23) MRI ([facility]): BIRADS-4  * LEFT (lower-central) 8mm NMLE  4. (10/11/23) Film consult  * Limited MRI  5. (11/07/23) MRI biopsy scheduled/not performed  * Lesion appears benign  6. (05/20/24) BILATERAL st-mammogram: BIRADS-    Check same day mammo    Breast History  Brenda Ortiz was in the office today for follow-up.    Brenda was initially evaluated in October 2023.  It was notable that she had a family history of breast cancer, and elevated Tyrer-Cuzick score and some imaging abnormalities.  Most significantly she presented with a breast MRI from an outside institution that showed suspicious enhancement in the left breast.  We performed a film consultation.  The MRI was noted to be somewhat difficult to interpret.  We scheduled the patient for an MRI guided biopsy of this area however at the time of the biopsy the findings were considered benign.    The patient presents for follow-up.    Breast Symptoms  Brenda has no symptoms to report.  Specifically, she has no palpable masses.  She notes no nipple discharge or retraction.  She has no skin retraction or discoloration.    Breast Examination  There are no cervical, supraclavicular, or infraclavicular lymph nodes palpable.    Inspection of the breast bilaterally demonstrate there to be no secondary signs of malignancy.  There are no lesions of the nipple or areola on either side.  No spontaneous discharges witnessed.    Palpation of the axilla bilaterally is without  No

## 2024-07-31 NOTE — OB RN TRIAGE NOTE - FALL HARM RISK - UNIVERSAL INTERVENTIONS
Bed in lowest position, wheels locked, appropriate side rails in place/Call bell, personal items and telephone in reach/Instruct patient to call for assistance before getting out of bed or chair/Non-slip footwear when patient is out of bed/[place] to call system/Physically safe environment - no spills, clutter or unnecessary equipment/Purposeful Proactive Rounding/Room/bathroom lighting operational, light cord in reach

## 2024-07-31 NOTE — CONSULT NOTE ADULT - SUBJECTIVE AND OBJECTIVE BOX
Maternal Fetal Medicine Consult     34 yo  at 22 2/7wga  presenting for a laminaria placement. The fetus is known to have CPAM (type 1) with mediastinal shift. The patient was previously counseled on the management options for pregnancy and CPAM - please see US documentation for full details. The patient and her partner have elected for pregnancy termination and a D&E is scheduled for 2024.     OB: G1 -  emergency pC/S for NRFHT (3345g). G2 - current.  Gyn: Abn PAP. colposcopy WNL, f/u WNL . Denies fibroids, ovarian cysts, or STI's.  PMH: hypothyroidism, on synthroid  PSH: pC/S . wisdom Teeth removal   Medications: PNV, synthroid 75  ALL: NKDA    Recommendations:   The patient is presenting for laminaria placement in preparation for a dilation and evacuation for pregnancy termination. After contemplation, the patient has elected to terminate the pregnancy. The patent is sure of her decision to terminate the pregnancy. The patient declined a consultation with Dr. Maggi Neri (reproductive psychologist). The patient was counseled on the risks, benefits, alternatives, and indications for pregnancy termination via IOL or D&E. The patient is aware that pregnancy termination begins with laminaria placement and mifepristone administration. All questions were answered and consents were signed.     The patient was placed in the dorsal lithotomy position. A speculum was placed into the vagina and the cervix was noted to be closed. The cervix was prepped with betadine and grasped with a ringed forcep. Ten laminaria were then inserted into the cervix. Two betadine soaked gauze were then placed into the superior aspect of the vagina. The speculum and all instruments were then removed. The patient tolerated the procedure well and was counseled on emergency precautions.

## 2024-07-31 NOTE — DISCHARGE NOTE OB - HOSPITAL COURSE
Patient presented for uncomplicated laminaria placement due to fetal anomaly. Plan for D&E tomorrow. Stable for discharge.

## 2024-07-31 NOTE — DISCHARGE NOTE OB - CARE PLAN
Principal Discharge DX:	Known fetal anomaly, antepartum, single or unspecified fetus  Assessment and plan of treatment:	return tomorrow for D&E   1

## 2024-07-31 NOTE — DISCHARGE NOTE OB - CARE PROVIDER_API CALL
Monique Prescott  Maternal/Fetal Medicine  [address]  [place] [address]  Phone: [phone]  Fax: [phone]  Follow Up Time:

## 2024-07-31 NOTE — DISCHARGE NOTE OB - NS MD DC FALL RISK RISK
For information on Fall & Injury Prevention, visit: [email or web address] OR  [email or web address] OR  https://www.cdc.gov/steadi/patient.html

## 2024-08-01 ENCOUNTER — TRANSCRIPTION ENCOUNTER (OUTPATIENT)
Age: 34
End: 2024-08-01

## 2024-08-01 ENCOUNTER — RESULT REVIEW (OUTPATIENT)
Age: 34
End: 2024-08-01

## 2024-08-02 DIAGNOSIS — O26.899 OTHER SPECIFIED PREGNANCY RELATED CONDITIONS, UNSPECIFIED TRIMESTER: ICD-10-CM

## 2024-08-06 ENCOUNTER — NON-APPOINTMENT (OUTPATIENT)
Age: 34
End: 2024-08-06

## 2024-08-06 PROBLEM — E03.9 HYPOTHYROIDISM, UNSPECIFIED: Chronic | Status: ACTIVE | Noted: 2024-07-31

## 2024-08-06 PROBLEM — O35.9XX0 MATERNAL CARE FOR (SUSPECTED) FETAL ABNORMALITY AND DAMAGE, UNSPECIFIED, NOT APPLICABLE OR UNSPECIFIED: Chronic | Status: ACTIVE | Noted: 2024-07-31

## 2024-08-07 DIAGNOSIS — O35.8XX0 MATERNAL CARE FOR OTHER (SUSPECTED) FETAL ABNORMALITY AND DAMAGE, NOT APPLICABLE OR UNSPECIFIED: ICD-10-CM

## 2024-08-07 DIAGNOSIS — Z3A.22 22 WEEKS GESTATION OF PREGNANCY: ICD-10-CM

## 2024-08-12 ENCOUNTER — APPOINTMENT (OUTPATIENT)
Dept: MATERNAL FETAL MEDICINE | Facility: CLINIC | Age: 34
End: 2024-08-12
Payer: COMMERCIAL

## 2024-08-12 PROCEDURE — 99214 OFFICE O/P EST MOD 30 MIN: CPT | Mod: 95

## 2024-08-12 NOTE — DISCUSSION/SUMMARY
[FreeTextEntry1] :  Recommendations: The patient is meeting all post-operative milestones and is clinically stable.

## 2024-08-12 NOTE — HISTORY OF PRESENT ILLNESS
[Home] : at home, [unfilled] , at the time of the visit. [Medical Office: ([facility])___] : at the medical office located in  [Verbal consent obtained from patient] : the patient, [unfilled] [FreeTextEntry1] :  Ms. Foster is 34 yo  s/p D&E at 22wga due to CPAM.  She reports minimal bleeding and breast engorgement that has resolved. She denies fever or discharge. She states that she is coping well emotionally and notes a strong support system. She reports no pain. She tolerates a regular diet without nausea/vomiting. She reports normal bowel and bladder function.

## 2024-12-27 ENCOUNTER — APPOINTMENT (OUTPATIENT)
Dept: ANTEPARTUM | Facility: CLINIC | Age: 34
End: 2024-12-27
Payer: COMMERCIAL

## 2024-12-27 ENCOUNTER — ASOB RESULT (OUTPATIENT)
Age: 34
End: 2024-12-27

## 2024-12-27 PROCEDURE — 76801 OB US < 14 WKS SINGLE FETUS: CPT | Mod: 59

## 2024-12-27 PROCEDURE — 93976 VASCULAR STUDY: CPT

## 2024-12-27 PROCEDURE — 76813 OB US NUCHAL MEAS 1 GEST: CPT

## 2025-01-31 ENCOUNTER — APPOINTMENT (OUTPATIENT)
Dept: ANTEPARTUM | Facility: CLINIC | Age: 35
End: 2025-01-31
Payer: COMMERCIAL

## 2025-01-31 ENCOUNTER — ASOB RESULT (OUTPATIENT)
Age: 35
End: 2025-01-31

## 2025-01-31 PROCEDURE — 76805 OB US >/= 14 WKS SNGL FETUS: CPT

## 2025-01-31 PROCEDURE — 76817 TRANSVAGINAL US OBSTETRIC: CPT

## 2025-02-28 ENCOUNTER — ASOB RESULT (OUTPATIENT)
Age: 35
End: 2025-02-28

## 2025-02-28 ENCOUNTER — APPOINTMENT (OUTPATIENT)
Dept: ANTEPARTUM | Facility: CLINIC | Age: 35
End: 2025-02-28
Payer: COMMERCIAL

## 2025-02-28 PROCEDURE — 76817 TRANSVAGINAL US OBSTETRIC: CPT

## 2025-02-28 PROCEDURE — 76811 OB US DETAILED SNGL FETUS: CPT

## 2025-03-21 ENCOUNTER — ASOB RESULT (OUTPATIENT)
Age: 35
End: 2025-03-21

## 2025-03-21 ENCOUNTER — APPOINTMENT (OUTPATIENT)
Dept: ANTEPARTUM | Facility: CLINIC | Age: 35
End: 2025-03-21
Payer: COMMERCIAL

## 2025-03-21 PROCEDURE — 76820 UMBILICAL ARTERY ECHO: CPT | Mod: 59

## 2025-03-21 PROCEDURE — 76816 OB US FOLLOW-UP PER FETUS: CPT

## 2025-05-02 ENCOUNTER — APPOINTMENT (OUTPATIENT)
Dept: ANTEPARTUM | Facility: CLINIC | Age: 35
End: 2025-05-02
Payer: COMMERCIAL

## 2025-05-02 ENCOUNTER — ASOB RESULT (OUTPATIENT)
Age: 35
End: 2025-05-02

## 2025-05-02 PROCEDURE — 76816 OB US FOLLOW-UP PER FETUS: CPT

## 2025-05-02 PROCEDURE — 76819 FETAL BIOPHYS PROFIL W/O NST: CPT | Mod: 59

## 2025-05-02 PROCEDURE — 76820 UMBILICAL ARTERY ECHO: CPT | Mod: 59

## 2025-05-02 PROCEDURE — 76817 TRANSVAGINAL US OBSTETRIC: CPT

## 2025-06-13 ENCOUNTER — ASOB RESULT (OUTPATIENT)
Age: 35
End: 2025-06-13

## 2025-06-13 ENCOUNTER — APPOINTMENT (OUTPATIENT)
Dept: ANTEPARTUM | Facility: CLINIC | Age: 35
End: 2025-06-13
Payer: COMMERCIAL

## 2025-06-13 PROCEDURE — 76820 UMBILICAL ARTERY ECHO: CPT | Mod: 59

## 2025-06-13 PROCEDURE — 76816 OB US FOLLOW-UP PER FETUS: CPT

## 2025-06-13 PROCEDURE — 76819 FETAL BIOPHYS PROFIL W/O NST: CPT | Mod: 59

## 2025-07-01 ENCOUNTER — OUTPATIENT (OUTPATIENT)
Dept: OUTPATIENT SERVICES | Facility: HOSPITAL | Age: 35
LOS: 1 days | End: 2025-07-01
Payer: COMMERCIAL

## 2025-07-01 DIAGNOSIS — Z98.891 HISTORY OF UTERINE SCAR FROM PREVIOUS SURGERY: Chronic | ICD-10-CM

## 2025-07-01 DIAGNOSIS — Z01.818 ENCOUNTER FOR OTHER PREPROCEDURAL EXAMINATION: ICD-10-CM

## 2025-07-01 LAB
ANION GAP SERPL CALC-SCNC: 10 MMOL/L — SIGNIFICANT CHANGE UP (ref 5–17)
APTT BLD: 23.7 SEC — LOW (ref 26.1–36.8)
BASOPHILS # BLD AUTO: 0.02 K/UL — SIGNIFICANT CHANGE UP (ref 0–0.2)
BASOPHILS NFR BLD AUTO: 0.2 % — SIGNIFICANT CHANGE UP (ref 0–2)
BLD GP AB SCN SERPL QL: NEGATIVE — SIGNIFICANT CHANGE UP
BUN SERPL-MCNC: 8 MG/DL — SIGNIFICANT CHANGE UP (ref 7–23)
CALCIUM SERPL-MCNC: 9.8 MG/DL — SIGNIFICANT CHANGE UP (ref 8.4–10.5)
CHLORIDE SERPL-SCNC: 103 MMOL/L — SIGNIFICANT CHANGE UP (ref 96–108)
CO2 SERPL-SCNC: 25 MMOL/L — SIGNIFICANT CHANGE UP (ref 22–31)
CREAT SERPL-MCNC: 0.65 MG/DL — SIGNIFICANT CHANGE UP (ref 0.5–1.3)
EGFR: 120 ML/MIN/1.73M2 — SIGNIFICANT CHANGE UP
EGFR: 120 ML/MIN/1.73M2 — SIGNIFICANT CHANGE UP
EOSINOPHIL # BLD AUTO: 0.04 K/UL — SIGNIFICANT CHANGE UP (ref 0–0.5)
EOSINOPHIL NFR BLD AUTO: 0.4 % — SIGNIFICANT CHANGE UP (ref 0–6)
GLUCOSE SERPL-MCNC: 82 MG/DL — SIGNIFICANT CHANGE UP (ref 70–99)
HCT VFR BLD CALC: 40.4 % — SIGNIFICANT CHANGE UP (ref 34.5–45)
HGB BLD-MCNC: 13 G/DL — SIGNIFICANT CHANGE UP (ref 11.5–15.5)
IMM GRANULOCYTES # BLD AUTO: 0.06 K/UL — SIGNIFICANT CHANGE UP (ref 0–0.07)
IMM GRANULOCYTES NFR BLD AUTO: 0.6 % — SIGNIFICANT CHANGE UP (ref 0–0.9)
INR BLD: 0.86 — SIGNIFICANT CHANGE UP (ref 0.85–1.16)
LYMPHOCYTES # BLD AUTO: 1.47 K/UL — SIGNIFICANT CHANGE UP (ref 1–3.3)
LYMPHOCYTES NFR BLD AUTO: 15.5 % — SIGNIFICANT CHANGE UP (ref 13–44)
MCHC RBC-ENTMCNC: 30.8 PG — SIGNIFICANT CHANGE UP (ref 27–34)
MCHC RBC-ENTMCNC: 32.2 G/DL — SIGNIFICANT CHANGE UP (ref 32–36)
MCV RBC AUTO: 95.7 FL — SIGNIFICANT CHANGE UP (ref 80–100)
MONOCYTES # BLD AUTO: 0.47 K/UL — SIGNIFICANT CHANGE UP (ref 0–0.9)
MONOCYTES NFR BLD AUTO: 5 % — SIGNIFICANT CHANGE UP (ref 2–14)
NEUTROPHILS # BLD AUTO: 7.4 K/UL — SIGNIFICANT CHANGE UP (ref 1.8–7.4)
NEUTROPHILS NFR BLD AUTO: 78.3 % — HIGH (ref 43–77)
NRBC # BLD AUTO: 0 K/UL — SIGNIFICANT CHANGE UP (ref 0–0)
NRBC # FLD: 0 K/UL — SIGNIFICANT CHANGE UP (ref 0–0)
NRBC BLD AUTO-RTO: 0 /100 WBCS — SIGNIFICANT CHANGE UP (ref 0–0)
PLATELET # BLD AUTO: 229 K/UL — SIGNIFICANT CHANGE UP (ref 150–400)
PMV BLD: 11.9 FL — SIGNIFICANT CHANGE UP (ref 7–13)
POTASSIUM SERPL-MCNC: 4.4 MMOL/L — SIGNIFICANT CHANGE UP (ref 3.5–5.3)
POTASSIUM SERPL-SCNC: 4.4 MMOL/L — SIGNIFICANT CHANGE UP (ref 3.5–5.3)
PROTHROM AB SERPL-ACNC: 9.9 SEC — SIGNIFICANT CHANGE UP (ref 9.9–13.4)
RBC # BLD: 4.22 M/UL — SIGNIFICANT CHANGE UP (ref 3.8–5.2)
RBC # FLD: 13.9 % — SIGNIFICANT CHANGE UP (ref 10.3–14.5)
RH IG SCN BLD-IMP: POSITIVE — SIGNIFICANT CHANGE UP
SODIUM SERPL-SCNC: 138 MMOL/L — SIGNIFICANT CHANGE UP (ref 135–145)
WBC # BLD: 9.46 K/UL — SIGNIFICANT CHANGE UP (ref 3.8–10.5)
WBC # FLD AUTO: 9.46 K/UL — SIGNIFICANT CHANGE UP (ref 3.8–10.5)

## 2025-07-01 PROCEDURE — 85610 PROTHROMBIN TIME: CPT

## 2025-07-01 PROCEDURE — 86900 BLOOD TYPING SEROLOGIC ABO: CPT

## 2025-07-01 PROCEDURE — 86850 RBC ANTIBODY SCREEN: CPT

## 2025-07-01 PROCEDURE — 86901 BLOOD TYPING SEROLOGIC RH(D): CPT

## 2025-07-01 PROCEDURE — 85730 THROMBOPLASTIN TIME PARTIAL: CPT

## 2025-07-01 PROCEDURE — 80048 BASIC METABOLIC PNL TOTAL CA: CPT

## 2025-07-01 PROCEDURE — 85025 COMPLETE CBC W/AUTO DIFF WBC: CPT

## 2025-07-03 ENCOUNTER — INPATIENT (INPATIENT)
Facility: HOSPITAL | Age: 35
LOS: 2 days | Discharge: HOME | End: 2025-07-06
Attending: SPECIALIST | Admitting: SPECIALIST
Payer: COMMERCIAL

## 2025-07-03 VITALS
HEART RATE: 78 BPM | OXYGEN SATURATION: 97 % | RESPIRATION RATE: 18 BRPM | WEIGHT: 177.03 LBS | SYSTOLIC BLOOD PRESSURE: 117 MMHG | DIASTOLIC BLOOD PRESSURE: 72 MMHG | TEMPERATURE: 98 F | HEIGHT: 64 IN

## 2025-07-03 DIAGNOSIS — Z98.890 OTHER SPECIFIED POSTPROCEDURAL STATES: Chronic | ICD-10-CM

## 2025-07-03 DIAGNOSIS — Z98.891 HISTORY OF UTERINE SCAR FROM PREVIOUS SURGERY: Chronic | ICD-10-CM

## 2025-07-03 LAB
BASOPHILS # BLD AUTO: 0.02 K/UL — SIGNIFICANT CHANGE UP (ref 0–0.2)
BASOPHILS NFR BLD AUTO: 0.2 % — SIGNIFICANT CHANGE UP (ref 0–2)
BLD GP AB SCN SERPL QL: NEGATIVE — SIGNIFICANT CHANGE UP
EOSINOPHIL # BLD AUTO: 0.03 K/UL — SIGNIFICANT CHANGE UP (ref 0–0.5)
EOSINOPHIL NFR BLD AUTO: 0.3 % — SIGNIFICANT CHANGE UP (ref 0–6)
HCT VFR BLD CALC: 35.2 % — SIGNIFICANT CHANGE UP (ref 34.5–45)
HGB BLD-MCNC: 11.9 G/DL — SIGNIFICANT CHANGE UP (ref 11.5–15.5)
IMM GRANULOCYTES # BLD AUTO: 0.04 K/UL — SIGNIFICANT CHANGE UP (ref 0–0.07)
IMM GRANULOCYTES NFR BLD AUTO: 0.5 % — SIGNIFICANT CHANGE UP (ref 0–0.9)
LYMPHOCYTES # BLD AUTO: 1.55 K/UL — SIGNIFICANT CHANGE UP (ref 1–3.3)
LYMPHOCYTES NFR BLD AUTO: 17.5 % — SIGNIFICANT CHANGE UP (ref 13–44)
MCHC RBC-ENTMCNC: 31.6 PG — SIGNIFICANT CHANGE UP (ref 27–34)
MCHC RBC-ENTMCNC: 33.8 G/DL — SIGNIFICANT CHANGE UP (ref 32–36)
MCV RBC AUTO: 93.4 FL — SIGNIFICANT CHANGE UP (ref 80–100)
MONOCYTES # BLD AUTO: 0.58 K/UL — SIGNIFICANT CHANGE UP (ref 0–0.9)
MONOCYTES NFR BLD AUTO: 6.5 % — SIGNIFICANT CHANGE UP (ref 2–14)
NEUTROPHILS # BLD AUTO: 6.64 K/UL — SIGNIFICANT CHANGE UP (ref 1.8–7.4)
NEUTROPHILS NFR BLD AUTO: 75 % — SIGNIFICANT CHANGE UP (ref 43–77)
NRBC # BLD AUTO: 0 K/UL — SIGNIFICANT CHANGE UP (ref 0–0)
NRBC # FLD: 0 K/UL — SIGNIFICANT CHANGE UP (ref 0–0)
NRBC BLD AUTO-RTO: 0 /100 WBCS — SIGNIFICANT CHANGE UP (ref 0–0)
PLATELET # BLD AUTO: 205 K/UL — SIGNIFICANT CHANGE UP (ref 150–400)
PMV BLD: 11.8 FL — SIGNIFICANT CHANGE UP (ref 7–13)
RBC # BLD: 3.77 M/UL — LOW (ref 3.8–5.2)
RBC # FLD: 13.8 % — SIGNIFICANT CHANGE UP (ref 10.3–14.5)
RH IG SCN BLD-IMP: POSITIVE — SIGNIFICANT CHANGE UP
WBC # BLD: 8.86 K/UL — SIGNIFICANT CHANGE UP (ref 3.8–10.5)
WBC # FLD AUTO: 8.86 K/UL — SIGNIFICANT CHANGE UP (ref 3.8–10.5)

## 2025-07-03 PROCEDURE — 86901 BLOOD TYPING SEROLOGIC RH(D): CPT

## 2025-07-03 PROCEDURE — 85025 COMPLETE CBC W/AUTO DIFF WBC: CPT

## 2025-07-03 PROCEDURE — 86900 BLOOD TYPING SEROLOGIC ABO: CPT

## 2025-07-03 PROCEDURE — 36415 COLL VENOUS BLD VENIPUNCTURE: CPT

## 2025-07-03 PROCEDURE — 86850 RBC ANTIBODY SCREEN: CPT

## 2025-07-03 DEVICE — ARISTA 3GR: Type: IMPLANTABLE DEVICE | Status: FUNCTIONAL

## 2025-07-03 RX ORDER — OXYCODONE HYDROCHLORIDE 30 MG/1
5 TABLET ORAL
Refills: 0 | Status: DISCONTINUED | OUTPATIENT
Start: 2025-07-03 | End: 2025-07-06

## 2025-07-03 RX ORDER — IBUPROFEN 200 MG
600 TABLET ORAL EVERY 6 HOURS
Refills: 0 | Status: COMPLETED | OUTPATIENT
Start: 2025-07-03 | End: 2026-06-01

## 2025-07-03 RX ORDER — MODIFIED LANOLIN 100 %
1 CREAM (GRAM) TOPICAL EVERY 6 HOURS
Refills: 0 | Status: DISCONTINUED | OUTPATIENT
Start: 2025-07-03 | End: 2025-07-06

## 2025-07-03 RX ORDER — KETOROLAC TROMETHAMINE 30 MG/ML
30 INJECTION, SOLUTION INTRAMUSCULAR; INTRAVENOUS EVERY 6 HOURS
Refills: 0 | Status: COMPLETED | OUTPATIENT
Start: 2025-07-03 | End: 2025-07-05

## 2025-07-03 RX ORDER — CITRIC ACID/SODIUM CITRATE 300-500 MG
30 SOLUTION, ORAL ORAL ONCE
Refills: 0 | Status: COMPLETED | OUTPATIENT
Start: 2025-07-03 | End: 2025-07-03

## 2025-07-03 RX ORDER — OXYCODONE HYDROCHLORIDE 30 MG/1
5 TABLET ORAL ONCE
Refills: 0 | Status: DISCONTINUED | OUTPATIENT
Start: 2025-07-03 | End: 2025-07-06

## 2025-07-03 RX ORDER — NALOXONE HYDROCHLORIDE 0.4 MG/ML
0.1 INJECTION, SOLUTION INTRAMUSCULAR; INTRAVENOUS; SUBCUTANEOUS
Refills: 0 | Status: DISCONTINUED | OUTPATIENT
Start: 2025-07-03 | End: 2025-07-06

## 2025-07-03 RX ORDER — SIMETHICONE 80 MG
80 TABLET,CHEWABLE ORAL EVERY 4 HOURS
Refills: 0 | Status: DISCONTINUED | OUTPATIENT
Start: 2025-07-03 | End: 2025-07-06

## 2025-07-03 RX ORDER — ONDANSETRON HCL/PF 4 MG/2 ML
4 VIAL (ML) INJECTION EVERY 6 HOURS
Refills: 0 | Status: DISCONTINUED | OUTPATIENT
Start: 2025-07-03 | End: 2025-07-06

## 2025-07-03 RX ORDER — ENOXAPARIN SODIUM 100 MG/ML
40 INJECTION SUBCUTANEOUS EVERY 24 HOURS
Refills: 0 | Status: DISCONTINUED | OUTPATIENT
Start: 2025-07-03 | End: 2025-07-06

## 2025-07-03 RX ORDER — CITRIC ACID/SODIUM CITRATE 300-500 MG
30 SOLUTION, ORAL ORAL ONCE
Refills: 0 | Status: DISCONTINUED | OUTPATIENT
Start: 2025-07-03 | End: 2025-07-03

## 2025-07-03 RX ORDER — CEFAZOLIN SODIUM IN 0.9 % NACL 3 G/100 ML
2000 INTRAVENOUS SOLUTION, PIGGYBACK (ML) INTRAVENOUS ONCE
Refills: 0 | Status: COMPLETED | OUTPATIENT
Start: 2025-07-03 | End: 2025-07-03

## 2025-07-03 RX ORDER — ACETAMINOPHEN 500 MG/5ML
975 LIQUID (ML) ORAL
Refills: 0 | Status: DISCONTINUED | OUTPATIENT
Start: 2025-07-03 | End: 2025-07-06

## 2025-07-03 RX ORDER — DIPHENHYDRAMINE HCL 12.5MG/5ML
25 ELIXIR ORAL EVERY 6 HOURS
Refills: 0 | Status: DISCONTINUED | OUTPATIENT
Start: 2025-07-03 | End: 2025-07-06

## 2025-07-03 RX ORDER — SODIUM CHLORIDE 9 G/1000ML
1000 INJECTION, SOLUTION INTRAVENOUS
Refills: 0 | Status: DISCONTINUED | OUTPATIENT
Start: 2025-07-03 | End: 2025-07-03

## 2025-07-03 RX ORDER — DEXAMETHASONE 0.5 MG/1
4 TABLET ORAL EVERY 6 HOURS
Refills: 0 | Status: DISCONTINUED | OUTPATIENT
Start: 2025-07-03 | End: 2025-07-06

## 2025-07-03 RX ORDER — SODIUM CHLORIDE 9 G/1000ML
1000 INJECTION, SOLUTION INTRAVENOUS
Refills: 0 | Status: DISCONTINUED | OUTPATIENT
Start: 2025-07-03 | End: 2025-07-06

## 2025-07-03 RX ORDER — OXYTOCIN-SODIUM CHLORIDE 0.9% IV SOLN 30 UNIT/500ML 30-0.9/5 UT/ML-%
42 SOLUTION INTRAVENOUS
Qty: 30 | Refills: 0 | Status: DISCONTINUED | OUTPATIENT
Start: 2025-07-03 | End: 2025-07-06

## 2025-07-03 RX ORDER — MAGNESIUM HYDROXIDE 400 MG/5ML
30 SUSPENSION ORAL
Refills: 0 | Status: DISCONTINUED | OUTPATIENT
Start: 2025-07-03 | End: 2025-07-06

## 2025-07-03 RX ORDER — ACETAMINOPHEN 500 MG/5ML
1000 LIQUID (ML) ORAL ONCE
Refills: 0 | Status: COMPLETED | OUTPATIENT
Start: 2025-07-03 | End: 2025-07-03

## 2025-07-03 RX ADMIN — Medication 20 MILLIGRAM(S): at 08:53

## 2025-07-03 RX ADMIN — Medication 4 MILLIGRAM(S): at 15:10

## 2025-07-03 RX ADMIN — KETOROLAC TROMETHAMINE 30 MILLIGRAM(S): 30 INJECTION, SOLUTION INTRAMUSCULAR; INTRAVENOUS at 18:29

## 2025-07-03 RX ADMIN — Medication 400 MILLIGRAM(S): at 13:11

## 2025-07-03 RX ADMIN — Medication 80 MILLIGRAM(S): at 23:59

## 2025-07-03 RX ADMIN — KETOROLAC TROMETHAMINE 30 MILLIGRAM(S): 30 INJECTION, SOLUTION INTRAMUSCULAR; INTRAVENOUS at 23:54

## 2025-07-03 RX ADMIN — Medication 30 MILLILITER(S): at 08:53

## 2025-07-03 RX ADMIN — SODIUM CHLORIDE 200 MILLILITER(S): 9 INJECTION, SOLUTION INTRAVENOUS at 07:31

## 2025-07-03 RX ADMIN — Medication 975 MILLIGRAM(S): at 22:00

## 2025-07-03 RX ADMIN — Medication 975 MILLIGRAM(S): at 21:38

## 2025-07-03 RX ADMIN — Medication 100 MILLIGRAM(S): at 08:53

## 2025-07-03 RX ADMIN — ENOXAPARIN SODIUM 40 MILLIGRAM(S): 100 INJECTION SUBCUTANEOUS at 21:39

## 2025-07-03 NOTE — OB RN DELIVERY SUMMARY - NSSELHIDDEN_OBGYN_ALL_OB_FT
[NS_DeliveryAttending1_OBGYN_ALL_OB_FT:[id number]==],[NS_DeliveryAssist1_OBGYN_ALL_OB_FT:[id number]=],[NS_DeliveryRN_OBGYN_ALL_OB_FT:[id number]=]

## 2025-07-03 NOTE — OB PROVIDER H&P - ASSESSMENT
A/P: 34y  @39w0d presents for rC/S   - Admit to L&D  - NPO and IVF  - Routine labs. Prenatal labs reviewed, as above.   - Ancef for infection ppx   - Anesthesia consulted   - Fetal status reassuring, continuous efm and toco.   - Plan to go to OR for scheduled c/s.  Discussed all r/b/a, consent signed. T&S active     D/w Dr. Gibbons, PGY-3 A/P: 34y  @39w0d presents for rC/S   - Admit to L&D  - NPO and IVF  - Routine labs. Prenatal labs reviewed, as above.   - Ancef for infection ppx   - Anesthesia consulted   - Fetal status reassuring, continuous efm and toco.   - Plan to go to OR for scheduled c/s.  Discussed all r/b/a, consent signed. T&S active     D/w Dr. Gibbons, PGY-3  D/w Dr. Land

## 2025-07-03 NOTE — OB PROVIDER H&P - HISTORY OF PRESENT ILLNESS
VANITA [id number]  S: 34yFemale  @ 39w0d presents for rC/s. Reports +FM, no LOF/VB/CTX.    Ante: Spontaneous uncomplicated pregnancy. NIPT and anatomy scans normal. Pt did not undergo CVS or amniocentesis during this pregnancy. GCT*. GBS+. EFW 3058. Took acyclovir during pregnancy ISO +herpes screening in  pregnanacy    ObHx:   - , elective D+E for CPAM finding at 22wks  - , stat C/S ISO NRFHRT during augmentation of labor, during pregnancy pt +herpes screening (non-sx, without outbreaks) started on acyclovir for prophylaxis,   GYNHx: H/o abnormal pap in  (s/p colpo), following pap smear normal  PMHx: Hypothyropidism (levothyroxine 75mcg qD during pregnancy, 50mcg during interval period)  SurgHx:    D+E   traditional C/S   Meds:   - Levothyroxine 75mcg  - PNV    Allergies: No Known Drug Allergies      PE  General: NAD; Lying comfortably in bed  Pulm: No increased work of breathing noted.   Abdomen: Soft, nontender, gravid.     SVE: deferred  SSE: deferred    NST: baseline 120, mod variability, +accels, -decels  [place]: CTX irregularly  TAUS: XXXXXX           VANITA [id number]  S: 34yFemale  @ 39w0d presents for rC/s. Reports +FM, no LOF/VB/CTX.    Ante: Spontaneous uncomplicated pregnancy. NIPT and anatomy scans normal. Pt did not undergo CVS or amniocentesis during this pregnancy. GCT*. GBS+. EFW 3058. Took acyclovir during pregnancy ISO +herpes screening in  pregnanacy    ObHx:   - , elective D+E for CPAM finding at 22wks  - , stat C/S ISO NRFHRT during augmentation of labor, during pregnancy pt +herpes screening (non-sx, without outbreaks) started on acyclovir for prophylaxis,   GYNHx: H/o abnormal pap in  (s/p colpo), following pap smear normal  PMHx: Hypothyropidism (levothyroxine 75mcg qD during pregnancy, 50mcg during interval period)  SurgHx:    D+E   traditional C/S   Meds:   - Levothyroxine 75mcg  - PNV    Allergies: No Known Drug Allergies      PE  General: NAD; Lying comfortably in bed  Pulm: No increased work of breathing noted.   Abdomen: Soft, nontender, gravid.     SVE: deferred  SSE: deferred    NST: baseline 120, mod variability, +accels, -decels  [place]: CTX irregularly  TAUS: cephalic presentation           VANITA [id number]  S: 34yFemale  @ 39w0d presents for rC/s. Reports +FM, no LOF/VB/CTX.    Ante: Spontaneous uncomplicated pregnancy. NIPT and anatomy scans normal. Pt did not undergo CVS or amniocentesis during this pregnancy. GCT*. GBS+. EFW 3058. Took acyclovir during pregnancy ISO +herpes screening in  pregnanacy    ObHx:   - , TOP D+E due to fetus with CPAM finding at 22wks  - , stat C/S ISO NRFHRT during augmentation of labor, during pregnancy pt +herpes screening (non-sx, without outbreaks) started on acyclovir for prophylaxis,   GYNHx: H/o abnormal pap in  (s/p colpo), following pap smear normal  PMHx: Hypothyropidism (levothyroxine 75mcg qD during pregnancy, 50mcg during interval period)  SurgHx:    D+E   primary C/S   Meds:   - Levothyroxine 75mcg  - PNV    Allergies: No Known Drug Allergies      PE  General: NAD; Lying comfortably in bed  Pulm: No increased work of breathing noted.   Abdomen: Soft, nontender, gravid.     SVE: deferred  SSE: deferred    NST: baseline 120, mod variability, +accels, -decels  [place]: CTX irregularly  TAUS: cephalic presentation

## 2025-07-03 NOTE — OB RN DELIVERY SUMMARY - NSBEGANLABOR_OBGYN_A_OB
[address] [address]  Sedation/Analgesia Post Sedation Record        Pt Name: Chandra Patel  MRN: [id number]  YOB: 1955  Procedure Performed By: Meghna Renteria MD, [address]  Primary Care Physician: Emmanuel Arredondo MD    POST-PROCEDURE                                  Sedation/Anesthesia:  Local Anesthesia and IV Conscious Sedation with continuous O2 monitoring    Estimated Blood Loss: 10 cc     Specimens Removed:  [x]None []Other:      Disposition of Specimen:  []Pathology []Other        Complications:   [x]None Immediate []Other:         Procedure Performed:  Left heart cath and PCI to LCx    Post Procedure Diagnosis/Findings:  STEMI/Coronary Artery Disease          Recommendations:    Transfer to Tele unit   DAPT    Lipid lowering therapy   Aggressive risk factor modification   Cardiac rehab   Monitor access site closely for bleeding   IV Fluids   Monitor for arrhythmias   2D Echo   Needs staged PCI of LAD in AM    All questions and concerns were addressed and patient is in agreement with plan.                  Meghna Renteria MD, [address]  Electronically signed 10/22/2020 at 3:51 PM
N/A

## 2025-07-03 NOTE — OB PROVIDER DELIVERY SUMMARY - NSSELHIDDEN_OBGYN_ALL_OB_FT
[NS_DeliveryAttending1_OBGYN_ALL_OB_FT:[id number]==],[NS_DeliveryAssist1_OBGYN_ALL_OB_FT:[email or web address]=],[NS_DeliveryRN_OBGYN_ALL_OB_FT:[id number]=]

## 2025-07-03 NOTE — OB RN INTRAOPERATIVE NOTE - NSSELHIDDEN_OBGYN_ALL_OB_FT
[NS_DeliveryAttending1_OBGYN_ALL_OB_FT:[id number]==],[NS_DeliveryAssist1_OBGYN_ALL_OB_FT:[email or web address]=],[NS_DeliveryRN_OBGYN_ALL_OB_FT:[id number]=]

## 2025-07-03 NOTE — PRE-ANESTHESIA EVALUATION ADULT - NSANTHPMHFT_GEN_ALL_CORE
PMHx: denies    PSxHx:  , D&E     OB Hx:  with IUP 39 weeks             G1- 2/2 arrest of dilation             G2-D&E @ 22 weeks             G3-now

## 2025-07-03 NOTE — OB PROVIDER H&P - NSLOWPPHRISK_OBGYN_A_OB
Oh Pregnancy/Less than or equal to 4 previous vaginal births/No known bleeding disorder/No history of postpartum hemorrhage/No other PPH risks indicated

## 2025-07-03 NOTE — OB RN DELIVERY SUMMARY - NS_SEPSISRSKCALC_OBGYN_ALL_OB_FT
EOS calculated successfully. EOS Risk Factor: 0.5/1000 live births ([facility] national incidence); GA=39w3d; Temp=97.9; ROM=0; GBS='Negative'; Antibiotics='Broad spectrum antibiotics 2-3.9 hrs prior to birth'

## 2025-07-03 NOTE — LACTATION INITIAL EVALUATION - NS LACT CON REASON FOR REQ
Dyad seen at 14hrs post birth.  Mom breast and bottle fed 1st child(5yrs old) due to low supply. Demonstrate hand expressing colostrum, bilateral expressible colostrum present. Place infant on Rt breast with football hold, infant latched with widely open flanged mouth and sucking sustained without nipple pain, mom feels pulling and tugging. Discuss how to maintain and increase breast milk supply, how to monitor adequate feeding and output, and possible Triple feeding plan. Consult discussed with primary RN/multiparous mom

## 2025-07-03 NOTE — OB PROVIDER DELIVERY SUMMARY - NSPROVIDERDELIVERYNOTE_OBGYN_ALL_OB_FT
Patient underwent scheduled repeat LTCS @39w0d. Prior  scar removed. Scar tissue noted at the midline between rectus muscles, carefully opened using bovie. Infant delivered from cephalic presentation through hysterotomy. Placenta delivered spontaneously and intact. Uterine atony noted, 1 dose of methergine give. Uterine closure in two layers first with 1 vicryl suture in running locked fashion. Then with 1 monocryl suture with imbrication. Reinforcing suture placed at right of hysterotomy for oozing. Excellent uterine tone achieved. Grossly normal appearing uterus, fallopian tubes and ovaries. Brittany was placed over the hysterotomy. Peritoneum closed using 1 chromic suture. Muscle closed with one mattress stitch using 1 chromic suture. Abdominal and skin closure per routine. .

## 2025-07-03 NOTE — OB RN PATIENT PROFILE - BILL OF RIGHTS/ADMISSION INFORMATION PROVIDED TO:
Pre-Op H&P  Bony Barnes  [id number]  1961      Chief complaint: Right knee pain      Subjective:  Patient is a 60 y.o.male presents for scheduled surgery by Dr. Cronin. He anticipates a KNEE ARTHROSCOPY WITH MEDIAL MENISCAL REPAIR VERSUS MENISCECTOMY today. His knee has been painful since last spring. He was walking and felt a pop in the right knee. He has had pain and swelling. He has been using a brace intermittently. Conservative treatments failed to provide lasting benefits.      Review of Systems:  Constitutional-- No fever, chills or sweats. No fatigue.  CV-- No chest pain, palpitation or syncope. +HLD  Resp-- No SOB, cough, hemoptysis  Skin--No rashes or lesions      Allergies:   Allergies   Allergen Reactions   • Atenolol Mental Status Change   • Tramadol Hives   • Ultram [Tramadol Hcl] Rash         Home Meds:  Medications Prior to Admission   Medication Sig Dispense Refill Last Dose   • albuterol (PROVENTIL HFA;VENTOLIN HFA) 108 (90 Base) MCG/ACT inhaler Inhale 2 puffs Every 6 (Six) Hours As Needed for Wheezing. 1 inhaler 0    • atorvastatin (LIPITOR) 40 MG tablet Take 40 mg by mouth Daily.      • hydrOXYzine (ATARAX) 25 MG tablet Take 25 mg by mouth At Night As Needed for Allergies.      • naproxen (EC NAPROSYN) 500 MG EC tablet Take 500 mg by mouth As Needed.      • omeprazole (priLOSEC) 20 MG capsule Take 20 mg by mouth As Needed.      • Oxycodone-Acetaminophen (PERCOCET PO) Take 7.5 mg by mouth As Needed.      • SITagliptin Phosphate (JANUVIA PO) Take  by mouth Every Night.      • sitaGLIPtin-metFORMIN (JANUMET)  MG per tablet Take 1 tablet by mouth Daily.            PMH:   Past Medical History:   Diagnosis Date   • Asthma    • Diabetes mellitus (HCC)    • Disease of thyroid gland    • Hyperlipidemia      PSH:    Past Surgical History:   Procedure Laterality Date   • COLONOSCOPY     • ENDOSCOPY     • KNEE ARTHROSCOPY     • NECK SURGERY     • SHOULDER SURGERY     • TONSILLECTOMY          Immunization History:  Influenza: No  Pneumococcal: UTD  Tetanus: UTD  Covid x3: 2021    Social History:   Tobacco:   Social History     Tobacco Use   Smoking Status Never Smoker   Smokeless Tobacco Never Used      Alcohol:     Social History     Substance and Sexual Activity   Alcohol Use No         Physical Exam: VS: /85  HR 82  RR 16  T 97.8  Sat 95%RA      General Appearance:    Alert, cooperative, no distress, appears stated age   Head:    Normocephalic, without obvious abnormality, atraumatic   Lungs:     Clear to auscultation bilaterally, respirations unlabored    Heart:   Regular rate and rhythm, S1 and S2 normal    Abdomen:    Soft without tenderness   Extremities:   Extremities normal, atraumatic, no cyanosis or edema   Skin:   Skin color, texture, turgor normal, no rashes or lesions   Neurologic:   Grossly intact     Results Review:     LABS:  Lab Results   Component Value Date    WBC 11.09 (H) 01/14/2022    HGB 15.7 01/14/2022    HCT 46.5 01/14/2022    MCV 88.2 01/14/2022     01/14/2022    NEUTROABS 7.66 (H) 01/14/2022    GLUCOSE 115 (H) 01/14/2022    BUN 17 01/14/2022    CREATININE 0.93 01/14/2022    EGFRIFNONA 83 01/14/2022     01/14/2022    K 4.9 01/14/2022     01/14/2022    CO2 25.0 01/14/2022    CALCIUM 10.4 01/14/2022    ALBUMIN 5.20 01/14/2022    AST 24 01/14/2022    ALT 33 01/14/2022    BILITOT 0.7 01/14/2022       RADIOLOGY:  Imaging Results (Last 72 Hours)     ** No results found for the last 72 hours. **          I reviewed the patient's new clinical results.    Cancer Staging (if applicable)  Cancer Patient: __ yes __no __unknown; If yes, clinical stage T:__ N:__M:__, stage group or __N/A      Impression: Right knee pain      Plan: KNEE ARTHROSCOPY WITH MEDIAL MENISCAL REPAIR VERSUS MENISCECTOMY      Lupe Villatoro, VIJAY   1/17/2022   07:11 EST   Patient

## 2025-07-03 NOTE — OB PROVIDER H&P - NS_PRENATALLABSOURCEHEPATITISC_OBGYN_ALL_OB
Have Your Skin Lesions Been Treated?: not been treated Is This A New Presentation, Or A Follow-Up?: Skin Lesions Additional History: Pt declines FBSE. Has moles on face and shoulder. Possible HS under breasts, chest and groin. hard copy, drawn during this pregnancy

## 2025-07-03 NOTE — OB RN PATIENT PROFILE - CURRENT PREGNANCY COMPLICATIONS, OB PROFILE
None

## 2025-07-04 LAB
BASOPHILS # BLD AUTO: 0.01 K/UL — SIGNIFICANT CHANGE UP (ref 0–0.2)
BASOPHILS NFR BLD AUTO: 0.1 % — SIGNIFICANT CHANGE UP (ref 0–2)
EOSINOPHIL # BLD AUTO: 0.03 K/UL — SIGNIFICANT CHANGE UP (ref 0–0.5)
EOSINOPHIL NFR BLD AUTO: 0.3 % — SIGNIFICANT CHANGE UP (ref 0–6)
HCT VFR BLD CALC: 28.8 % — LOW (ref 34.5–45)
HGB BLD-MCNC: 9.6 G/DL — LOW (ref 11.5–15.5)
IMM GRANULOCYTES # BLD AUTO: 0.05 K/UL — SIGNIFICANT CHANGE UP (ref 0–0.07)
IMM GRANULOCYTES NFR BLD AUTO: 0.5 % — SIGNIFICANT CHANGE UP (ref 0–0.9)
LYMPHOCYTES # BLD AUTO: 1.37 K/UL — SIGNIFICANT CHANGE UP (ref 1–3.3)
LYMPHOCYTES NFR BLD AUTO: 13.2 % — SIGNIFICANT CHANGE UP (ref 13–44)
MCHC RBC-ENTMCNC: 32 PG — SIGNIFICANT CHANGE UP (ref 27–34)
MCHC RBC-ENTMCNC: 33.3 G/DL — SIGNIFICANT CHANGE UP (ref 32–36)
MCV RBC AUTO: 96 FL — SIGNIFICANT CHANGE UP (ref 80–100)
MONOCYTES # BLD AUTO: 0.59 K/UL — SIGNIFICANT CHANGE UP (ref 0–0.9)
MONOCYTES NFR BLD AUTO: 5.7 % — SIGNIFICANT CHANGE UP (ref 2–14)
NEUTROPHILS # BLD AUTO: 8.3 K/UL — HIGH (ref 1.8–7.4)
NEUTROPHILS NFR BLD AUTO: 80.2 % — HIGH (ref 43–77)
NRBC # BLD AUTO: 0 K/UL — SIGNIFICANT CHANGE UP (ref 0–0)
NRBC # FLD: 0 K/UL — SIGNIFICANT CHANGE UP (ref 0–0)
NRBC BLD AUTO-RTO: 0 /100 WBCS — SIGNIFICANT CHANGE UP (ref 0–0)
PLATELET # BLD AUTO: 144 K/UL — LOW (ref 150–400)
PMV BLD: 11 FL — SIGNIFICANT CHANGE UP (ref 7–13)
RBC # BLD: 3 M/UL — LOW (ref 3.8–5.2)
RBC # FLD: 13.8 % — SIGNIFICANT CHANGE UP (ref 10.3–14.5)
T PALLIDUM AB TITR SER: NEGATIVE — SIGNIFICANT CHANGE UP
WBC # BLD: 10.35 K/UL — SIGNIFICANT CHANGE UP (ref 3.8–10.5)
WBC # FLD AUTO: 10.35 K/UL — SIGNIFICANT CHANGE UP (ref 3.8–10.5)

## 2025-07-04 PROCEDURE — 86850 RBC ANTIBODY SCREEN: CPT

## 2025-07-04 PROCEDURE — 86900 BLOOD TYPING SEROLOGIC ABO: CPT

## 2025-07-04 PROCEDURE — 85025 COMPLETE CBC W/AUTO DIFF WBC: CPT

## 2025-07-04 PROCEDURE — 86780 TREPONEMA PALLIDUM: CPT

## 2025-07-04 PROCEDURE — 36415 COLL VENOUS BLD VENIPUNCTURE: CPT

## 2025-07-04 PROCEDURE — 86901 BLOOD TYPING SEROLOGIC RH(D): CPT

## 2025-07-04 RX ORDER — LEVOTHYROXINE SODIUM 300 MCG
75 TABLET ORAL DAILY
Refills: 0 | Status: DISCONTINUED | OUTPATIENT
Start: 2025-07-04 | End: 2025-07-06

## 2025-07-04 RX ADMIN — Medication 75 MICROGRAM(S): at 07:13

## 2025-07-04 RX ADMIN — Medication 80 MILLIGRAM(S): at 07:25

## 2025-07-04 RX ADMIN — KETOROLAC TROMETHAMINE 30 MILLIGRAM(S): 30 INJECTION, SOLUTION INTRAMUSCULAR; INTRAVENOUS at 07:13

## 2025-07-04 RX ADMIN — KETOROLAC TROMETHAMINE 30 MILLIGRAM(S): 30 INJECTION, SOLUTION INTRAMUSCULAR; INTRAVENOUS at 12:29

## 2025-07-04 RX ADMIN — KETOROLAC TROMETHAMINE 30 MILLIGRAM(S): 30 INJECTION, SOLUTION INTRAMUSCULAR; INTRAVENOUS at 17:33

## 2025-07-04 RX ADMIN — Medication 80 MILLIGRAM(S): at 21:18

## 2025-07-04 RX ADMIN — ENOXAPARIN SODIUM 40 MILLIGRAM(S): 100 INJECTION SUBCUTANEOUS at 22:17

## 2025-07-04 RX ADMIN — KETOROLAC TROMETHAMINE 30 MILLIGRAM(S): 30 INJECTION, SOLUTION INTRAMUSCULAR; INTRAVENOUS at 00:20

## 2025-07-04 RX ADMIN — KETOROLAC TROMETHAMINE 30 MILLIGRAM(S): 30 INJECTION, SOLUTION INTRAMUSCULAR; INTRAVENOUS at 07:40

## 2025-07-04 RX ADMIN — Medication 975 MILLIGRAM(S): at 15:15

## 2025-07-04 RX ADMIN — Medication 975 MILLIGRAM(S): at 17:13

## 2025-07-04 RX ADMIN — Medication 975 MILLIGRAM(S): at 03:00

## 2025-07-04 RX ADMIN — Medication 975 MILLIGRAM(S): at 21:18

## 2025-07-04 RX ADMIN — KETOROLAC TROMETHAMINE 30 MILLIGRAM(S): 30 INJECTION, SOLUTION INTRAMUSCULAR; INTRAVENOUS at 13:00

## 2025-07-04 RX ADMIN — Medication 975 MILLIGRAM(S): at 02:39

## 2025-07-04 NOTE — PROGRESS NOTE ADULT - SUBJECTIVE AND OBJECTIVE BOX
Patient evaluated at bedside this morning, resting comfortable in bed, with no acute events overnight.  She reports pain is well controlled with oral pain medications.   She denies vomiting or heavy vaginal bleeding.  She has not tried ambulating since procedure, bautista remains in place at this time. Tolerating diet.    Physical Exam:  Vital Signs Last 24 Hrs  T(C): 36.6 (04 Jul 2025 06:00), Max: 37 (03 Jul 2025 18:00)  T(F): 97.9 (04 Jul 2025 06:00), Max: 98.6 (03 Jul 2025 18:00)  HR: 72 (04 Jul 2025 06:00) (67 - 85)  BP: 93/59 (04 Jul 2025 06:00) (93/59 - 118/57)  BP(mean): 70 (04 Jul 2025 06:00) (62 - 75)  RR: 18 (04 Jul 2025 06:00) (16 - 18)  SpO2: 97% (04 Jul 2025 06:00) (96% - 100%)    Parameters below as of 04 Jul 2025 06:00  Patient On (Oxygen Delivery Method): room air       GA: NAD, A+0 x 3  Pulm: no increased WOB  Abd: soft, nontender, mildly distended, no rebound or guarding, incision clean, dry and intact, uterus firm at midline, 2 fb below umbilicus  : bautista in situ, lochia WNL  Extremities: mild lower extremity edema, no calf tenderness, SCDs in place                            9.6    10.35 )-----------( 144      ( 04 Jul 2025 05:30 )             28.8          acetaminophen     Tablet .. 975 milliGRAM(s) Oral <User Schedule>  dexAMETHasone  Injectable 4 milliGRAM(s) IV Push every 6 hours PRN  diphenhydrAMINE 25 milliGRAM(s) Oral every 6 hours PRN  diphtheria/tetanus/pertussis (acellular) Vaccine (Adacel) 0.5 milliLiter(s) IntraMuscular once  enoxaparin Injectable 40 milliGRAM(s) SubCutaneous every 24 hours  ibuprofen  Tablet. 600 milliGRAM(s) Oral every 6 hours  ketorolac   Injectable 30 milliGRAM(s) IV Push every 6 hours  lactated ringers. 1000 milliLiter(s) IV Continuous <Continuous>  lanolin Ointment 1 Application(s) Topical every 6 hours PRN  levothyroxine 75 MICROGram(s) Oral daily  magnesium hydroxide Suspension 30 milliLiter(s) Oral two times a day PRN  morphine PF Spinal 0.15 milliGRAM(s) IntraThecal. once  naloxone Injectable 0.1 milliGRAM(s) IV Push every 3 minutes PRN  ondansetron Injectable 4 milliGRAM(s) IV Push every 6 hours PRN  oxyCODONE    IR 5 milliGRAM(s) Oral every 3 hours PRN  oxyCODONE    IR 5 milliGRAM(s) Oral once PRN  oxytocin Infusion 42 milliUNIT(s)/Min IV Continuous <Continuous>  simethicone 80 milliGRAM(s) Chew every 4 hours PRN      34y Female POD#1  s/p C/S, Uncomplicated                                       - Neuro/Pain:  toradol atc, tylenol atc, oxy prn  - CV:  VS per routine, AM CBC pending  - Pulm: Encourage ISS & Ambulation  - GI: Advance as tolerated  - : Bautista in place, to be removed this morning, TOV this afternoon  - DVT ppx: SCDs, Lovenox 40mg QD  - Dispo: POD #3/4 Patient evaluated at bedside this morning, resting comfortable in bed, with no acute events overnight.  She reports pain is well controlled with oral pain medications.   She denies vomiting or heavy vaginal bleeding.  She has not tried ambulating since procedure, bautista remains in place at this time. Tolerating jello and broth. No flatus yet.     Physical Exam:  Vital Signs Last 24 Hrs  T(C): 36.6 (04 Jul 2025 06:00), Max: 37 (03 Jul 2025 18:00)  T(F): 97.9 (04 Jul 2025 06:00), Max: 98.6 (03 Jul 2025 18:00)  HR: 72 (04 Jul 2025 06:00) (67 - 85)  BP: 93/59 (04 Jul 2025 06:00) (93/59 - 118/57)  BP(mean): 70 (04 Jul 2025 06:00) (62 - 75)  RR: 18 (04 Jul 2025 06:00) (16 - 18)  SpO2: 97% (04 Jul 2025 06:00) (96% - 100%)    Parameters below as of 04 Jul 2025 06:00  Patient On (Oxygen Delivery Method): room air       GA: NAD, A+0 x 3  Pulm: no increased WOB  Abd: soft, nontender, mildly distended, no rebound or guarding, incision clean, dry and intact, uterus firm at midline, 2 fb below umbilicus  : bautista in situ, lochia WNL  Extremities: mild lower extremity edema, no calf tenderness, SCDs in place                            9.6    10.35 )-----------( 144      ( 04 Jul 2025 05:30 )             28.8          acetaminophen     Tablet .. 975 milliGRAM(s) Oral <User Schedule>  dexAMETHasone  Injectable 4 milliGRAM(s) IV Push every 6 hours PRN  diphenhydrAMINE 25 milliGRAM(s) Oral every 6 hours PRN  diphtheria/tetanus/pertussis (acellular) Vaccine (Adacel) 0.5 milliLiter(s) IntraMuscular once  enoxaparin Injectable 40 milliGRAM(s) SubCutaneous every 24 hours  ibuprofen  Tablet. 600 milliGRAM(s) Oral every 6 hours  ketorolac   Injectable 30 milliGRAM(s) IV Push every 6 hours  lactated ringers. 1000 milliLiter(s) IV Continuous <Continuous>  lanolin Ointment 1 Application(s) Topical every 6 hours PRN  levothyroxine 75 MICROGram(s) Oral daily  magnesium hydroxide Suspension 30 milliLiter(s) Oral two times a day PRN  morphine PF Spinal 0.15 milliGRAM(s) IntraThecal. once  naloxone Injectable 0.1 milliGRAM(s) IV Push every 3 minutes PRN  ondansetron Injectable 4 milliGRAM(s) IV Push every 6 hours PRN  oxyCODONE    IR 5 milliGRAM(s) Oral every 3 hours PRN  oxyCODONE    IR 5 milliGRAM(s) Oral once PRN  oxytocin Infusion 42 milliUNIT(s)/Min IV Continuous <Continuous>  simethicone 80 milliGRAM(s) Chew every 4 hours PRN      34y Female POD#1  s/p C/S, Uncomplicated                                       - Neuro/Pain:  toradol atc, tylenol atc, oxy prn  - CV:  VS per routine, AM CBC pending  - Pulm: Encourage ISS & Ambulation  - GI: Advance as tolerated; not yet passing flatus.  - : Bautista in place, to be removed this morning, TOV this afternoon  - DVT ppx: SCDs, Lovenox 40mg QD  - Dispo: POD #3/4

## 2025-07-05 ENCOUNTER — TRANSCRIPTION ENCOUNTER (OUTPATIENT)
Age: 35
End: 2025-07-05

## 2025-07-05 RX ORDER — ACETAMINOPHEN 500 MG/5ML
3 LIQUID (ML) ORAL
Qty: 0 | Refills: 0 | DISCHARGE
Start: 2025-07-05

## 2025-07-05 RX ORDER — IBUPROFEN 200 MG
600 TABLET ORAL EVERY 6 HOURS
Refills: 0 | Status: DISCONTINUED | OUTPATIENT
Start: 2025-07-05 | End: 2025-07-06

## 2025-07-05 RX ORDER — IBUPROFEN 200 MG
1 TABLET ORAL
Qty: 0 | Refills: 0 | DISCHARGE
Start: 2025-07-05

## 2025-07-05 RX ADMIN — KETOROLAC TROMETHAMINE 30 MILLIGRAM(S): 30 INJECTION, SOLUTION INTRAMUSCULAR; INTRAVENOUS at 01:54

## 2025-07-05 RX ADMIN — KETOROLAC TROMETHAMINE 30 MILLIGRAM(S): 30 INJECTION, SOLUTION INTRAMUSCULAR; INTRAVENOUS at 00:55

## 2025-07-05 RX ADMIN — Medication 975 MILLIGRAM(S): at 10:11

## 2025-07-05 RX ADMIN — KETOROLAC TROMETHAMINE 30 MILLIGRAM(S): 30 INJECTION, SOLUTION INTRAMUSCULAR; INTRAVENOUS at 06:48

## 2025-07-05 RX ADMIN — Medication 975 MILLIGRAM(S): at 04:00

## 2025-07-05 RX ADMIN — Medication 975 MILLIGRAM(S): at 01:00

## 2025-07-05 RX ADMIN — Medication 975 MILLIGRAM(S): at 03:02

## 2025-07-05 RX ADMIN — Medication 600 MILLIGRAM(S): at 18:06

## 2025-07-05 RX ADMIN — Medication 975 MILLIGRAM(S): at 22:00

## 2025-07-05 RX ADMIN — Medication 975 MILLIGRAM(S): at 15:31

## 2025-07-05 RX ADMIN — Medication 80 MILLIGRAM(S): at 10:11

## 2025-07-05 RX ADMIN — Medication 975 MILLIGRAM(S): at 21:00

## 2025-07-05 RX ADMIN — Medication 75 MICROGRAM(S): at 06:48

## 2025-07-05 RX ADMIN — Medication 600 MILLIGRAM(S): at 12:43

## 2025-07-05 RX ADMIN — ENOXAPARIN SODIUM 40 MILLIGRAM(S): 100 INJECTION SUBCUTANEOUS at 22:47

## 2025-07-05 RX ADMIN — Medication 80 MILLIGRAM(S): at 03:08

## 2025-07-05 RX ADMIN — KETOROLAC TROMETHAMINE 30 MILLIGRAM(S): 30 INJECTION, SOLUTION INTRAMUSCULAR; INTRAVENOUS at 07:28

## 2025-07-05 NOTE — DISCHARGE NOTE OB - CARE PLAN
1 Principal Discharge DX:	Postpartum state  Assessment and plan of treatment:	Please follow-up with your OB doctor within 1-2 weeks for an incision check. You can resume a regular diet at home and you should continue your prenatal vitamins as directed. You can take Tylenol 650mg every 6 hours for pain alternating with Motrin 600mg every 6 hours if needed.     Please place nothing in the vagina for 6 weeks (no tampons, no sex, no douching, no baths, no hot tubs, no swimming pools, etc). If you have severe headaches and/or vision changes, heavy bleeding, chest pain, or shortness of breath, please call your provider or go to the nearest ED. Call your OB with any signs of symptoms of infection including fever > 100.4 degrees, severe pain, malodorous vaginal discharge or heavy bleeding requiring more than 1-2 pads/hour.

## 2025-07-05 NOTE — DISCHARGE NOTE OB - FINANCIAL ASSISTANCE
[facility] provides services at a reduced cost to those who are determined to be eligible through [facility]’s financial assistance program. Information regarding [facility]’s financial assistance program can be found by going to [email or web address] or by calling [phone].

## 2025-07-05 NOTE — DISCHARGE NOTE OB - CARE PROVIDER_API CALL
Judi Land  Obstetrics & Gynecology  [address] [place] [id number]  Phone: [phone]  Fax: [phone]  Follow Up Time:

## 2025-07-05 NOTE — DISCHARGE NOTE OB - MATERIALS PROVIDED
Vaccinations/[facility]  Screening Program/  Immunization Record/Breastfeeding Log/Bottle Feeding Log/Guide to Postpartum Care/[facility] Hearing Screen Program/Back To Sleep Handout/Shaken Baby Prevention Handout/Breastfeeding Guide and Packet/Birth Certificate Instructions/Discharge Medication Information for Patients and Families Pocket Guide/Tdap Vaccination (VIS Pub Date: 2012)

## 2025-07-05 NOTE — DISCHARGE NOTE OB - PATIENT PORTAL LINK FT
You can access the FollowMyHealth Patient Portal offered by [facility] by registering at the following website: [email or web address]. By joining [facility]’s FollowMyHealth portal, you will also be able to view your health information using other applications (apps) compatible with our system.

## 2025-07-05 NOTE — DISCHARGE NOTE OB - NS MD DC FALL RISK RISK
For information on Fall & Injury Prevention, visit: [email or web address] OR  [email or web address] OR  https://www.cdc.gov/steadi/patient.html

## 2025-07-05 NOTE — PROGRESS NOTE ADULT - SUBJECTIVE AND OBJECTIVE BOX
Patient evaluated at bedside this morning, resting comfortable in bed.   She reports pain is well controlled with oral pain medications.   She denies vomiting or heavy vaginal bleeding.  She has been ambulating without assistance, voiding spontaneously, passing gas, tolerating regular diet.     Physical Exam:  Vital Signs Last 24 Hrs  T(C): 36.5 (2025 06:05), Max: 36.9 (2025 13:36)  T(F): 97.7 (2025 06:05), Max: 98.4 (2025 13:36)  HR: 65 (2025 06:05) (65 - 85)  BP: 110/75 (2025 06:05) (90/56 - 110/75)  BP(mean): 68 (2025 08:50) (68 - 68)  RR: 17 (2025 06:05) (17 - 18)  SpO2: 96% (2025 06:05) (96% - 99%)    Parameters below as of 2025 21:59  Patient On (Oxygen Delivery Method): room air        GA: NAD, A+0 x 3  Pulm: no increased WOB  Abd: soft, nontender, mildly distended, no rebound or guarding, incision clean, dry and intact, uterus firm at midline, 2 fb below umbilicus  Extremities: mild lower extremity edema, no calf tenderness                             9.6    10.35 )-----------( 144      ( 2025 05:30 )             28.8          A/P:  s/p  section, POD#2, stable  - VSS  -  Pain: PO motrin q6hrs, tylenol q6hrs, oxycodone for severe pain PRN  -  Post-operatively labs: hemodynamically stable, no symptoms of anemia   -  GI: tolerating regular diet, passing gas  -  : s/p bautista , urinating without difficulty  -  DVT prophylaxis: encouraged increased ambulation, SCDs, SQL  -  Dispo: POD 3 or 4

## 2025-07-05 NOTE — DISCHARGE NOTE OB - PROVIDER RX CONTACT NUMBER
[phone] Advancement-Rotation Flap Text: The defect edges were debeveled with a #15 scalpel blade.  Given the location of the defect, shape of the defect and the proximity to free margins an advancement-rotation flap was deemed most appropriate.  Using a sterile surgical marker, an appropriate flap was drawn incorporating the defect and placing the expected incisions within the relaxed skin tension lines where possible. The area thus outlined was incised deep to adipose tissue with a #15 scalpel blade.  The skin margins were undermined to an appropriate distance in all directions utilizing iris scissors.

## 2025-07-05 NOTE — DISCHARGE NOTE OB - HOSPITAL COURSE
Pt is a 34yF s/p c-sx.  Please see delivery note for details.  During postpartum course patient's vitals were stable, vaginal bleeding appropriate, and pain well controlled.  On day of discharge patient was ambulating, pt had adequate oral intake, and was voiding freely.  Discharge instructions and precautions were given.  Will return to clinic in 1-2 weeks for incision check.

## 2025-07-05 NOTE — DISCHARGE NOTE OB - AVOID SEXUAL ACTIVITY UNTIL YOUR POSTPARTUM VISIT
"Pessary    Date/Time: 6/11/2025 9:30 AM    Performed by: Adele Neal CNM  Authorized by: Adele Neal CNM    [facility] Protocol:  procedure performed by consultantConsent: Verbal consent obtained  Risks and benefits: risks, benefits and alternatives were discussed  Consent given by: patient  Time out: Immediately prior to procedure a \"time out\" was called to verify the correct patient, procedure, equipment, support staff and site/side marked as required.  Patient understanding: patient states understanding of the procedure being performed  Patient consent: the patient's understanding of the procedure matches consent given  Procedure consent: procedure consent matches procedure scheduled  Relevant documents: relevant documents present and verified  Radiology Images displayed and confirmed. If images not available, report reviewed: imaging studies available  Required items: required blood products, implants, devices, and special equipment available  Patient identity confirmed: verbally with patient    Indication:     Indication for pessary: uterine prolapse, cystocele and rectocele    Pre-procedure:     Pessary procedure type:  Cleaning/check  Problems:     Pessary complications: none    Procedure:     Pessary type:  Gellhorn flexible    Pessary size:  4    Patient tolerance of procedure:  Tolerated well, no immediate complications  Comments:     Procedure comments:  Speculum exam completed negative ulcerations, negative bleeding.  Will return in 3 months    "
Statement Selected

## 2025-07-05 NOTE — DISCHARGE NOTE OB - MEDICATION SUMMARY - MEDICATIONS TO TAKE
I will START or STAY ON the medications listed below when I get home from the hospital:    ibuprofen 600 mg oral tablet  -- 1 tab(s) by mouth every 6 hours  -- Indication: For Postpartum state    Tycolene 325 mg oral tablet  -- 3 tab(s) by mouth every 6 hours  -- Indication: For Postpartum state    Synthroid 75 mcg (0.075 mg) oral tablet  -- 1 tab(s) by mouth once a day  -- Indication: For Hypothyroidism

## 2025-07-06 VITALS
RESPIRATION RATE: 17 BRPM | DIASTOLIC BLOOD PRESSURE: 70 MMHG | HEART RATE: 80 BPM | TEMPERATURE: 99 F | OXYGEN SATURATION: 96 % | SYSTOLIC BLOOD PRESSURE: 100 MMHG

## 2025-07-06 PROCEDURE — C1889: CPT

## 2025-07-06 PROCEDURE — 86901 BLOOD TYPING SEROLOGIC RH(D): CPT

## 2025-07-06 PROCEDURE — 85025 COMPLETE CBC W/AUTO DIFF WBC: CPT

## 2025-07-06 PROCEDURE — 59050 FETAL MONITOR W/REPORT: CPT

## 2025-07-06 PROCEDURE — 86900 BLOOD TYPING SEROLOGIC ABO: CPT

## 2025-07-06 PROCEDURE — 36415 COLL VENOUS BLD VENIPUNCTURE: CPT

## 2025-07-06 PROCEDURE — 86780 TREPONEMA PALLIDUM: CPT

## 2025-07-06 PROCEDURE — 86850 RBC ANTIBODY SCREEN: CPT

## 2025-07-06 RX ADMIN — Medication 975 MILLIGRAM(S): at 03:12

## 2025-07-06 RX ADMIN — Medication 975 MILLIGRAM(S): at 10:05

## 2025-07-06 RX ADMIN — Medication 75 MICROGRAM(S): at 05:59

## 2025-07-06 RX ADMIN — Medication 600 MILLIGRAM(S): at 05:59

## 2025-07-06 RX ADMIN — Medication 600 MILLIGRAM(S): at 00:23

## 2025-07-06 RX ADMIN — Medication 975 MILLIGRAM(S): at 03:49

## 2025-07-06 RX ADMIN — Medication 600 MILLIGRAM(S): at 00:35

## 2025-07-06 RX ADMIN — Medication 600 MILLIGRAM(S): at 12:37

## 2025-07-06 NOTE — PROGRESS NOTE ADULT - ASSESSMENT
A/P: 34y s/p  section, POD#3, stable  -  Pain: PO motrin q6hrs, tylenol q8hrs, oxycodone for severe pain PRN  -  Post-operatively labs: hemodynamically stable, no symptoms of anemia   -  GI: tolerating regular diet, passing gas  -  : s/p bautista , urinating without difficulty  -  DVT prophylaxis: encouraged increased ambulation, SCDs, SQL  -  Dispo: POD 3 or 4

## 2025-07-06 NOTE — PROGRESS NOTE ADULT - SUBJECTIVE AND OBJECTIVE BOX
Patient evaluated at bedside this morning, resting comfortable in bed.   She reports pain is well controlled with oral pain medications.   She denies headache, dizziness, chest pain, shortness of breath, vomiting or heavy vaginal bleeding.  She has been ambulating without assistance, voiding spontaneously, passing gas, tolerating regular diet.     Physical Exam:  Vital Signs Last 24 Hrs  T(C): 36.6 (06 Jul 2025 02:10), Max: 37.2 (05 Jul 2025 18:00)  T(F): 97.8 (06 Jul 2025 02:10), Max: 98.9 (05 Jul 2025 18:00)  HR: 69 (06 Jul 2025 02:10) (69 - 84)  BP: 111/76 (06 Jul 2025 02:10) (107/72 - 111/76)  BP(mean): --  RR: 17 (06 Jul 2025 02:10) (17 - 18)  SpO2: 97% (06 Jul 2025 02:10) (97% - 97%)    Parameters below as of 05 Jul 2025 10:00  Patient On (Oxygen Delivery Method): room air        GA: NAD, A+0 x 3  Pulm: no increased WOB  Abd: soft, nontender, mildly distended, no rebound or guarding, incision clean, dry and intact, uterus firm at midline, 2 fb below umbilicus  Extremities: mild lower extremity edema, no calf tenderness

## 2025-07-10 DIAGNOSIS — Z28.09 IMMUNIZATION NOT CARRIED OUT BECAUSE OF OTHER CONTRAINDICATION: ICD-10-CM

## 2025-07-10 DIAGNOSIS — O34.211 MATERNAL CARE FOR LOW TRANSVERSE SCAR FROM PREVIOUS CESAREAN DELIVERY: ICD-10-CM

## 2025-07-10 DIAGNOSIS — E03.9 HYPOTHYROIDISM, UNSPECIFIED: ICD-10-CM

## 2025-07-10 DIAGNOSIS — Z3A.39 39 WEEKS GESTATION OF PREGNANCY: ICD-10-CM

## 2025-07-10 DIAGNOSIS — Z79.890 HORMONE REPLACEMENT THERAPY: ICD-10-CM
